# Patient Record
Sex: FEMALE | Race: AMERICAN INDIAN OR ALASKA NATIVE | Employment: OTHER | URBAN - METROPOLITAN AREA
[De-identification: names, ages, dates, MRNs, and addresses within clinical notes are randomized per-mention and may not be internally consistent; named-entity substitution may affect disease eponyms.]

---

## 2017-03-17 ENCOUNTER — HOSPITAL ENCOUNTER (OUTPATIENT)
Dept: CT IMAGING | Age: 72
Discharge: HOME | End: 2017-03-17
Attending: INTERNAL MEDICINE
Payer: MEDICARE

## 2017-03-17 DIAGNOSIS — R20.0 RIGHT UPPER EXTREMITY NUMBNESS: ICD-10-CM

## 2017-03-17 PROCEDURE — 70450 CT HEAD/BRAIN W/O DYE: CPT

## 2017-04-17 ENCOUNTER — HOSPITAL ENCOUNTER (OUTPATIENT)
Dept: RESPIRATORY THERAPY | Age: 72
Discharge: HOME | End: 2017-04-17
Attending: INTERNAL MEDICINE
Payer: MEDICARE

## 2017-04-17 DIAGNOSIS — R06.02 SHORTNESS OF BREATH: ICD-10-CM

## 2017-04-17 PROCEDURE — 94729 DIFFUSING CAPACITY: CPT

## 2017-04-17 PROCEDURE — 94060 EVALUATION OF WHEEZING: CPT

## 2017-04-17 PROCEDURE — 94726 PLETHYSMOGRAPHY LUNG VOLUMES: CPT

## 2017-05-16 ENCOUNTER — HOSPITAL ENCOUNTER (OUTPATIENT)
Dept: MAMMOGRAPHY | Age: 72
Discharge: HOME | End: 2017-05-16
Attending: INTERNAL MEDICINE
Payer: MEDICARE

## 2017-05-16 DIAGNOSIS — Z12.31 VISIT FOR SCREENING MAMMOGRAM: ICD-10-CM

## 2017-05-16 PROCEDURE — 77063 BREAST TOMOSYNTHESIS BI: CPT

## 2018-07-23 ENCOUNTER — HOSPITAL ENCOUNTER (OUTPATIENT)
Dept: BONE DENSITY | Age: 73
Discharge: HOME | End: 2018-07-23
Attending: FAMILY MEDICINE
Payer: MEDICARE

## 2018-07-23 ENCOUNTER — HOSPITAL ENCOUNTER (OUTPATIENT)
Dept: MAMMOGRAPHY | Age: 73
Discharge: HOME | End: 2018-07-23
Attending: FAMILY MEDICINE
Payer: MEDICARE

## 2018-07-23 DIAGNOSIS — Z12.39 BREAST CANCER SCREENING: ICD-10-CM

## 2018-07-23 DIAGNOSIS — M81.0 AGE-RELATED OSTEOPOROSIS WITHOUT CURRENT PATHOLOGICAL FRACTURE: ICD-10-CM

## 2018-07-23 PROCEDURE — 77080 DXA BONE DENSITY AXIAL: CPT

## 2018-07-23 PROCEDURE — 77063 BREAST TOMOSYNTHESIS BI: CPT

## 2018-09-25 ENCOUNTER — HOSPITAL ENCOUNTER (OUTPATIENT)
Dept: ULTRASOUND IMAGING | Age: 73
Discharge: HOME | End: 2018-09-25
Attending: FAMILY MEDICINE
Payer: MEDICARE

## 2018-09-25 DIAGNOSIS — R63.4 WEIGHT LOSS: ICD-10-CM

## 2018-09-25 PROCEDURE — 76700 US EXAM ABDOM COMPLETE: CPT

## 2019-01-07 ENCOUNTER — HOSPITAL ENCOUNTER (OUTPATIENT)
Age: 74
Discharge: HOME | End: 2019-01-07
Payer: MEDICARE

## 2019-01-07 DIAGNOSIS — M54.12 CERVICAL RADICULOPATHY: ICD-10-CM

## 2019-01-07 PROCEDURE — 72141 MRI NECK SPINE W/O DYE: CPT

## 2020-10-05 ENCOUNTER — TRANSCRIBE ORDER (OUTPATIENT)
Dept: SCHEDULING | Age: 75
End: 2020-10-05

## 2020-10-05 DIAGNOSIS — D64.9 ANEMIA, UNSPECIFIED: Primary | ICD-10-CM

## 2020-11-16 ENCOUNTER — HOSPITAL ENCOUNTER (OUTPATIENT)
Dept: INTERVENTIONAL RADIOLOGY/VASCULAR | Age: 75
Discharge: HOME | End: 2020-11-16
Attending: INTERNAL MEDICINE | Admitting: RADIOLOGY
Payer: MEDICARE

## 2020-11-16 VITALS
OXYGEN SATURATION: 100 % | HEIGHT: 65 IN | SYSTOLIC BLOOD PRESSURE: 122 MMHG | DIASTOLIC BLOOD PRESSURE: 62 MMHG | HEART RATE: 70 BPM | WEIGHT: 143 LBS | RESPIRATION RATE: 11 BRPM | BODY MASS INDEX: 23.82 KG/M2

## 2020-11-16 DIAGNOSIS — D64.9 ANEMIA, UNSPECIFIED: ICD-10-CM

## 2020-11-16 LAB
ANION GAP SERPL CALC-SCNC: 6 MMOL/L (ref 3–18)
APTT PPP: 50.9 SEC (ref 23–36.4)
BASOPHILS # BLD: 0 K/UL (ref 0–0.1)
BASOPHILS NFR BLD: 0 % (ref 0–2)
BUN SERPL-MCNC: 25 MG/DL (ref 7–18)
BUN/CREAT SERPL: 22 (ref 12–20)
CALCIUM SERPL-MCNC: 9.3 MG/DL (ref 8.5–10.1)
CHLORIDE SERPL-SCNC: 103 MMOL/L (ref 100–111)
CO2 SERPL-SCNC: 29 MMOL/L (ref 21–32)
CREAT SERPL-MCNC: 1.15 MG/DL (ref 0.6–1.3)
DIFFERENTIAL METHOD BLD: ABNORMAL
EOSINOPHIL # BLD: 0.2 K/UL (ref 0–0.4)
EOSINOPHIL NFR BLD: 3 % (ref 0–5)
ERYTHROCYTE [DISTWIDTH] IN BLOOD BY AUTOMATED COUNT: 14.4 % (ref 11.6–14.5)
EST. AVERAGE GLUCOSE BLD GHB EST-MCNC: 295 MG/DL
GLUCOSE SERPL-MCNC: 262 MG/DL (ref 74–99)
HBA1C MFR BLD: 11.9 % (ref 4.2–5.6)
HCT VFR BLD AUTO: 31 % (ref 35–45)
HGB BLD-MCNC: 10.3 G/DL (ref 12–16)
INR PPP: 1.1 (ref 0.8–1.2)
LYMPHOCYTES # BLD: 1.7 K/UL (ref 0.9–3.6)
LYMPHOCYTES NFR BLD: 26 % (ref 21–52)
MCH RBC QN AUTO: 28.2 PG (ref 24–34)
MCHC RBC AUTO-ENTMCNC: 33.2 G/DL (ref 31–37)
MCV RBC AUTO: 84.9 FL (ref 74–97)
MONOCYTES # BLD: 0.6 K/UL (ref 0.05–1.2)
MONOCYTES NFR BLD: 9 % (ref 3–10)
NEUTS SEG # BLD: 4.1 K/UL (ref 1.8–8)
NEUTS SEG NFR BLD: 62 % (ref 40–73)
PLATELET # BLD AUTO: 327 K/UL (ref 135–420)
PMV BLD AUTO: 9.8 FL (ref 9.2–11.8)
POTASSIUM SERPL-SCNC: 4 MMOL/L (ref 3.5–5.5)
PROTHROMBIN TIME: 14.2 SEC (ref 11.5–15.2)
RBC # BLD AUTO: 3.65 M/UL (ref 4.2–5.3)
SODIUM SERPL-SCNC: 138 MMOL/L (ref 136–145)
WBC # BLD AUTO: 6.6 K/UL (ref 4.6–13.2)

## 2020-11-16 PROCEDURE — 85610 PROTHROMBIN TIME: CPT

## 2020-11-16 PROCEDURE — 88311 DECALCIFY TISSUE: CPT

## 2020-11-16 PROCEDURE — 38221 DX BONE MARROW BIOPSIES: CPT

## 2020-11-16 PROCEDURE — 88237 TISSUE CULTURE BONE MARROW: CPT

## 2020-11-16 PROCEDURE — 80048 BASIC METABOLIC PNL TOTAL CA: CPT

## 2020-11-16 PROCEDURE — 85730 THROMBOPLASTIN TIME PARTIAL: CPT

## 2020-11-16 PROCEDURE — 88184 FLOWCYTOMETRY/ TC 1 MARKER: CPT

## 2020-11-16 PROCEDURE — 74011250636 HC RX REV CODE- 250/636: Performed by: RADIOLOGY

## 2020-11-16 PROCEDURE — 85025 COMPLETE CBC W/AUTO DIFF WBC: CPT

## 2020-11-16 PROCEDURE — 88313 SPECIAL STAINS GROUP 2: CPT

## 2020-11-16 PROCEDURE — 88185 FLOWCYTOMETRY/TC ADD-ON: CPT

## 2020-11-16 PROCEDURE — 83036 HEMOGLOBIN GLYCOSYLATED A1C: CPT

## 2020-11-16 PROCEDURE — 74011000250 HC RX REV CODE- 250: Performed by: RADIOLOGY

## 2020-11-16 PROCEDURE — 88264 CHROMOSOME ANALYSIS 20-25: CPT

## 2020-11-16 PROCEDURE — 88305 TISSUE EXAM BY PATHOLOGIST: CPT

## 2020-11-16 RX ORDER — WARFARIN SODIUM 5 MG/1
5 TABLET ORAL
COMMUNITY

## 2020-11-16 RX ORDER — MIDAZOLAM HYDROCHLORIDE 1 MG/ML
.5-2 INJECTION, SOLUTION INTRAMUSCULAR; INTRAVENOUS
Status: DISCONTINUED | OUTPATIENT
Start: 2020-11-16 | End: 2020-11-16 | Stop reason: HOSPADM

## 2020-11-16 RX ORDER — ONDANSETRON 2 MG/ML
4 INJECTION INTRAMUSCULAR; INTRAVENOUS
Status: DISCONTINUED | OUTPATIENT
Start: 2020-11-16 | End: 2020-11-16 | Stop reason: HOSPADM

## 2020-11-16 RX ORDER — LIDOCAINE HYDROCHLORIDE 10 MG/ML
30 INJECTION, SOLUTION EPIDURAL; INFILTRATION; INTRACAUDAL; PERINEURAL ONCE
Status: COMPLETED | OUTPATIENT
Start: 2020-11-16 | End: 2020-11-16

## 2020-11-16 RX ORDER — METFORMIN HYDROCHLORIDE 500 MG/1
500 TABLET ORAL
COMMUNITY

## 2020-11-16 RX ORDER — AMLODIPINE AND BENAZEPRIL HYDROCHLORIDE 5; 40 MG/1; MG/1
1 CAPSULE ORAL DAILY
COMMUNITY

## 2020-11-16 RX ORDER — ATORVASTATIN CALCIUM 40 MG/1
40 TABLET, FILM COATED ORAL DAILY
COMMUNITY

## 2020-11-16 RX ORDER — SODIUM CHLORIDE 9 MG/ML
75 INJECTION, SOLUTION INTRAVENOUS CONTINUOUS
Status: DISCONTINUED | OUTPATIENT
Start: 2020-11-16 | End: 2020-11-16 | Stop reason: HOSPADM

## 2020-11-16 RX ORDER — HYDROCODONE BITARTRATE AND ACETAMINOPHEN 5; 325 MG/1; MG/1
1 TABLET ORAL
Status: DISCONTINUED | OUTPATIENT
Start: 2020-11-16 | End: 2020-11-16 | Stop reason: HOSPADM

## 2020-11-16 RX ORDER — SODIUM CHLORIDE 9 MG/ML
20 INJECTION, SOLUTION INTRAVENOUS CONTINUOUS
Status: DISCONTINUED | OUTPATIENT
Start: 2020-11-16 | End: 2020-11-16 | Stop reason: HOSPADM

## 2020-11-16 RX ORDER — FENTANYL CITRATE 50 UG/ML
12.5-5 INJECTION, SOLUTION INTRAMUSCULAR; INTRAVENOUS
Status: DISCONTINUED | OUTPATIENT
Start: 2020-11-16 | End: 2020-11-16 | Stop reason: HOSPADM

## 2020-11-16 RX ADMIN — FENTANYL CITRATE 25 MCG: 50 INJECTION, SOLUTION INTRAMUSCULAR; INTRAVENOUS at 09:55

## 2020-11-16 RX ADMIN — FENTANYL CITRATE 50 MCG: 50 INJECTION, SOLUTION INTRAMUSCULAR; INTRAVENOUS at 09:50

## 2020-11-16 RX ADMIN — MIDAZOLAM 0.5 MG: 1 INJECTION INTRAMUSCULAR; INTRAVENOUS at 09:55

## 2020-11-16 RX ADMIN — LIDOCAINE HYDROCHLORIDE 30 ML: 10 INJECTION, SOLUTION EPIDURAL; INFILTRATION; INTRACAUDAL; PERINEURAL at 09:50

## 2020-11-16 RX ADMIN — MIDAZOLAM 1 MG: 1 INJECTION INTRAMUSCULAR; INTRAVENOUS at 09:50

## 2020-11-16 NOTE — ROUTINE PROCESS
TRANSFER - OUT REPORT:    Verbal report given to [address] [facility] on [address]  being transferred to [facility] for routine progression of care       Report consisted of patients Situation, Background, Assessment and   Recommendations(SBAR). Information from the following report(s) SBAR, Kardex, Procedure Summary, Intake/Output, MAR and Recent Results was reviewed with the receiving nurse. Lines:   Peripheral IV 11/16/20 Right Antecubital (Active)   Site Assessment Clean, dry, & intact 11/16/20 0945   Phlebitis Assessment 0 11/16/20 0945   Infiltration Assessment 0 11/16/20 0945   Dressing Status Clean, dry, & intact 11/16/20 0945   Dressing Type Transparent 11/16/20 0945   Hub Color/Line Status Pink; Infusing;Patent 11/16/20 0945        Opportunity for questions and clarification was provided.       Patient transported with:   Registered Nurse

## 2020-11-16 NOTE — PROGRESS NOTES
TRANSFER - IN REPORT:    Verbal report received from Tj(name) on [facility]  being received from IR(unit) for routine post - op      Report consisted of patients Situation, Background, Assessment and   Recommendations(SBAR). Information from the following report(s) SBAR, Procedure Summary and MAR was reviewed with the receiving nurse. Opportunity for questions and clarification was provided. Assessment completed upon patients arrival to unit and care assumed.

## 2020-11-16 NOTE — H&P
OUTPATIENT HISTORY AND PHYSICAL      Today 11/16/2020     Indication/Symptoms:   Reina Lockhart is a 66 y.o. female presenting for a bone marrow biopsy with moderate sedation at the request of due to anemia. Current Meds:    Prior to Admission medications    Medication Sig Start Date End Date Taking? Authorizing Provider   atorvastatin (Lipitor) 40 mg tablet Take 40 mg by mouth daily. Yes Provider, Historical   metFORMIN (GLUCOPHAGE) 500 mg tablet Take 500 mg by mouth daily (with breakfast). Yes Provider, Historical   warfarin (COUMADIN) 5 mg tablet Take 5 mg by mouth three (3) days a week. Yes Provider, Historical   amLODIPine-benazepril (LOTREL) 5-40 mg per capsule Take 1 Cap by mouth daily. Yes Provider, Historical   benazepril (LOTENSIN) 5 mg tablet Take 5 mg by mouth daily. Provider, Historical   glipiZIDE SR (GLUCOTROL) 2.5 mg CR tablet Take  by mouth daily. Provider, Historical       Allergies:    No Known Allergies    Comorbid Conditions:    Past Medical History:   Diagnosis Date    Diabetes ([facility] [address]     Hypertension           Past Surgical History:   Procedure Laterality Date    COLONOSCOPY N/A 4/12/2018    COLONOSCOPY, DIAGNOSTIC performed by Mirta Hernández MD at [facility] ENDOSCOPY     Data:    Visit Vitals  /70 (BP 1 Location: Left arm, BP Patient Position: At rest)   Pulse 78   Resp 15   Ht 5' 5\" (1.651 m)   Wt 64.9 kg (143 lb)   SpO2 100%   BMI 23.80 kg/m²   :  Recent Labs     11/16/20  0848        Recent Labs     11/16/20  0848   INR 1.1   APTT 50.9*       The H & P and/or progress notes and any available imaging were reviewed. The risks, indications and possible alternatives to the procedure, including doing nothing, were discussed and informed consent was obtained. Physical Exam:      Mental status:   Alert and oriented. Examination specific to the procedure proposed to be performed and any co morbid conditions:   Mallampati classification 2, ASA III.    Heart: Regular rate. Lungs:   Normal respiratory effort. Symmetrical rise and fall of chest    The patient is an appropriate candidate to undergo the planned procedure and sedation.     Martin Rodriguez PA-C

## 2020-11-16 NOTE — PROCEDURES
RADIOLOGY POST PROCEDURE NOTE     November 16, 2020       10:06 AM     Preoperative Diagnosis:   Anemia. Postoperative Diagnosis:  Same. :  Dr. Lisy Cheek    Assistant:  None. Type of Anesthesia: 1% plain lidocaine and IV moderate sedation with Versed and Fentanyl. Procedure/Description:  Image guided bone marrow Bx. Findings:   No bleeding. Estimated blood Loss:  Minimal    Specimen Removed:   yes    Blood transfusions:  None. Implants:  None.     Complications: None    Condition: Stable    Discharge Plan:  discharge home     Tin Brannon MD

## 2020-11-16 NOTE — DISCHARGE INSTRUCTIONS
Patient Education        Bone Marrow Aspiration and Biopsy: What to Expect at Home  Your Recovery  The biopsy site may feel sore for several days. It can help to walk, take pain medicine, and put ice packs on the site. You will probably be able to return to work and your usual activities the day after the procedure. Your doctor or nurse will call you with the results of your test.  This care sheet gives you a general idea about how long it will take for you to recover. But each person recovers at a different pace. Follow the steps below to get better as quickly as possible. How can you care for yourself at home? Activity    · Rest when you feel tired. Getting enough sleep will help you recover.     · You may drive when you are no longer taking pain pills and can quickly move your foot from the gas pedal to the brake. You must also be able to sit comfortably for a long period of time, even if you do not plan to go far. You might get caught in traffic.     · Most people are able to return to work the day after the procedure. Medicines    · Your doctor will tell you if and when you can restart your medicines. He or she will also give you instructions about taking any new medicines.     · If you take aspirin or some other blood thinner, ask your doctor if and when to start taking it again. Make sure that you understand exactly what your doctor wants you to do.     · Be safe with medicines. Take pain medicines exactly as directed. ? If the doctor gave you a prescription medicine for pain, take it as prescribed. ? If you are not taking a prescription pain medicine, take an over-the-counter medicine such as acetaminophen (Tylenol), ibuprofen (Advil, Motrin), or naproxen (Aleve). Read and follow all instructions on the label. ? Do not take two or more pain medicines at the same time unless the doctor told you to. Many pain medicines have acetaminophen, which is Tylenol.  Too much acetaminophen (Tylenol) can be harmful.     · If you think your pain medicine is making you sick to your stomach:  ? Take your medicine after meals (unless your doctor has told you not to). ? Ask your doctor for a different pain medicine.     · If your doctor prescribed antibiotics, take them as directed. Do not stop taking them just because you feel better. Ice    · Put ice or a cold pack on the biopsy site for 10 to 20 minutes at a time. Put a thin cloth between the ice and your skin. Follow-up care is a key part of your treatment and safety. Be sure to make and go to all appointments, and call your doctor if you are having problems. It's also a good idea to know your test results and keep a list of the medicines you take. When should you call for help? Call 911 anytime you think you may need emergency care. For example, call if:    · You passed out (lost consciousness). Call your doctor now or seek immediate medical care if:    · You have signs of infection, such as:  ? Increased pain, swelling, warmth, or redness. ? Red streaks leading from the biopsy site. ? Pus draining from the biopsy site. ? Swollen lymph nodes in your neck, armpits, or groin. ? A fever. Watch closely for any changes in your health, and be sure to contact your doctor if:    · You are not getting better as expected. Where can you learn more? Go to http://www.gray.com/  Enter E148 in the search box to learn more about \"Bone Marrow Aspiration and Biopsy: What to Expect at Home. \"  Current as of: December 9, 2019               Content Version: 12.6  © 4653-5398 [employer]. Care instructions adapted under license by "[employer] (which disclaims liability or warranty for this information). If you have questions about a medical condition or this instruction, always ask your healthcare professional. [address] any warranty or liability for your use of this information.

## 2021-02-01 ENCOUNTER — TRANSCRIBE ORDER (OUTPATIENT)
Dept: SCHEDULING | Age: 76
End: 2021-02-01

## 2021-02-01 DIAGNOSIS — R10.9 ABDOMINAL CRAMPS: ICD-10-CM

## 2021-02-01 DIAGNOSIS — R63.4 LOSS OF WEIGHT: Primary | ICD-10-CM

## 2021-02-22 ENCOUNTER — HOSPITAL ENCOUNTER (OUTPATIENT)
Dept: CT IMAGING | Age: 76
Discharge: HOME | End: 2021-02-22
Attending: INTERNAL MEDICINE
Payer: MEDICARE

## 2021-02-22 DIAGNOSIS — R10.9 ABDOMINAL CRAMPS: ICD-10-CM

## 2021-02-22 DIAGNOSIS — R63.4 LOSS OF WEIGHT: ICD-10-CM

## 2021-02-22 LAB — CREAT UR-MCNC: 1.1 MG/DL (ref 0.6–1.3)

## 2021-02-22 PROCEDURE — 74011000636 HC RX REV CODE- 636: Performed by: INTERNAL MEDICINE

## 2021-02-22 PROCEDURE — 74177 CT ABD & PELVIS W/CONTRAST: CPT

## 2021-02-22 PROCEDURE — 82565 ASSAY OF CREATININE: CPT

## 2021-02-22 RX ADMIN — IOPAMIDOL 100 ML: 612 INJECTION, SOLUTION INTRAVENOUS at 14:55

## 2021-02-25 ENCOUNTER — TRANSCRIBE ORDER (OUTPATIENT)
Dept: SCHEDULING | Age: 76
End: 2021-02-25

## 2021-02-25 DIAGNOSIS — R93.1 ABNORMAL ECHOCARDIOGRAM: ICD-10-CM

## 2021-02-25 DIAGNOSIS — R63.4 WEIGHT LOSS: ICD-10-CM

## 2021-02-25 DIAGNOSIS — I51.7 CARDIOMEGALY: Primary | ICD-10-CM

## 2021-02-25 DIAGNOSIS — C78.7 SECONDARY MALIGNANT NEOPLASM OF LIVER (HCC): ICD-10-CM

## 2021-02-25 DIAGNOSIS — I48.91 ATRIAL FIBRILLATION (HCC): ICD-10-CM

## 2021-02-25 DIAGNOSIS — Z79.899 ENCOUNTER FOR LONG-TERM (CURRENT) USE OF OTHER MEDICATIONS: ICD-10-CM

## 2021-05-05 ENCOUNTER — TELEPHONE (OUTPATIENT)
Dept: CARDIAC REHAB | Age: 76
End: 2021-05-05

## 2021-05-05 NOTE — TELEPHONE ENCOUNTER
Cardiac Rehab called patient and she wants to see what her insurance covers for the program. Will follow up with benefit information.     Thank you,  Sebastián Gallardo

## 2021-05-12 ENCOUNTER — HOSPITAL ENCOUNTER (OUTPATIENT)
Dept: CARDIAC REHAB | Age: 76
Discharge: HOME | End: 2021-05-12
Payer: MEDICARE

## 2021-05-12 VITALS — BODY MASS INDEX: 23.96 KG/M2 | WEIGHT: 144 LBS

## 2021-05-12 PROCEDURE — 93798 PHYS/QHP OP CAR RHAB W/ECG: CPT

## 2021-05-12 NOTE — PROGRESS NOTES
Outpatient Nutritional Counseling Order    Alex Wagner is a [de-identified] year old [de-identified] with [unfilled]. She is enrolled in cardiac rehabilitation and the treating clinician believes the patient would benefit from nutritional counseling services due to her diagnosis of Type 2 Diabetes. Please co-sign this note if you would like your patient to be evaluated by a Registered Dietician in our clinic.

## 2021-05-12 NOTE — PROGRESS NOTES
CARDIAC REHAB INITIAL ITP FOR REVIEW AND SIGNATURE    Alba Puente 64 y.o. presented to cardiac rehab for an intake and a six minute walk test today with a primary diagnosis of Congestive Heart Failure. Patient's EF is 29%. Alba Puente has a history of Hypertension, Hyperlipidemia, Diabetes Mellitus and CHF. Cardiac risk factors include diabetes mellitus, hypertension and these were reviewed with patient. Alba Puente is not  and lives with daughter . PHQ-9, depression score, is 0 and this is considered to below. The result was discussed with patient who confirms score to be accurate and  a copy of patient's results were sent to patient's PCP. Patient denied chest pain or SOB during 6 minute walk and the cardiac rhythm was in Atrial Fibrillation. Alba Puente will attend exercise and educational sessions 3 days a week in cardiac rehab for 36 sessions. Goals for Rehab:    Patient name: Alba Puente : 1945         Goals Comments   1. Increase heart strength   [x] initial  [] met                  [] not met  [] progressing  Pt. Would like to increase EF from 29% to 35% by end of program    2.  Walk faster   [x] initial  [] met                  [] not met  [] progressing Improve walking speed from 1.2 to 1.6 by next recert         ardiac ITP     CR INTAKE from 2021 in [facility]34   Treatment Diagnosis   Treatment Diagnosis 1 HF   HF Date 21   Referral Date 21   Significant Cardiovascular History Chronic atrial fibrillation, History of heart failure   Co-morbidities Diabetes, Pulmonary disease   Individual Treatment Plan   ITP Visit Type Initial Assessment   1st Date of Exercise  21   ITP Next Review Date 21   Visit #/Total Visits    EF % 29 %   Risk Stratification High   ITP Exercise, Psychosocial, Tobacco, Nutrition, Education   Exercise    Stages of Change Preparation   DASI Total Score 32.95   Assisted Devices None   Total Score 1   Test Six minute walk test   Exercise Prescription   Mode Treadmill, Bike, Stepper, Ergometer   Frequency per week 3   Duration per session 35-45   Intensity  METS       1.5-2.5   RPE 11-13   Target Heart Rate 85-99   Resistance Training Yes   Exercise Blood Pressures   Resting /75   Peak /80   Is BP WDL? Yes   Exercise Activity at Home   Type None   Resistance Training No   Exercise Education   Education Exercise safety, Signs/Symptoms to report, RPE scale, Equipment orientation, Warm up/Cool down, Physically active, Self pulse   Exercise Target Goal   Target Goal(s) Individual exercise RX, Aerobic activity 30 + minutes/day  5 days/week, BP < 140/90 or < 130/80, if DM or CKD   Patient Stated Exercise Goals increase heart strength, walk farther faster   Psychosocial   Stages of Change Preparation   [facility] Total Score 11   PHQ 9 Score 0   Psychosocial Intervention   Interventions No intervention indicated   Consults    Currently Taking Psychotropic Meds No   Medication Changes No   Psychosocial Education   Education Advanced directives, Benefits of CPR completion, Cardiac meds, Environmental triggers, Coping techniques, Impact self care behaviors on health, Relaxation techniques, Sexual activity, Signs/Symptoms of depression, Stress management class, Support groups, Tobacco dangers, Traveling with lung disease   Psychosocial Target Goals   Target Goal(s) Engages in self-care behaviors, Demonstrates appropriate interaction with others, Assess presence or absence of depression using a valid screening tool, Positive support group, Maximizes coping skills   Uses Stress Mgmt Techniques No   Patient Stated Psychosocial Goals pt. will have consult with LCSW   Nutrition   Stages of Change Preparation   Diabetes Yes   HbA1C 7.5   HbA1C Date 03/18/21   BG at Home Yes   BG Frequency Occasional   Diabetes Med(s) metformin, amaryl   Diabetes Med(s) Change No   BG in Range?  Yes   Random  Lipids   Date Lipids Drawn 03/22/21   Total 117   HDL 32   LDL 70   Triglycerides 73   Lipid Med(s) lipitor   Lipid Med Change(s) No   Weight Management   Weight  65.3 kg (144 lb)   Height  5' 5\" (1.651 m)   BMI 24.01   Weight Goal gain to 150   Waist Circumference  36   Alcohol Special   Nutrition Assessment Tool RYP   Rate Your Plate Total Score 52   Nutrition Intervention   Dietitian Consult Yes   Nurse/Patient Discussion Yes   Nutrition Goals check sodium on nutrition facts   Nutrition Class Yes   Diabetes Education Referral No   Lipid Clinic Referral No   Weight Management Referral No   Nutrition Education   Education Signs/Symptoms Hypo/Hyperglycemia, DM & CAD relationship, Low sodium diet, Healthy eating   Nutrition Target Goals   Target Goals HbA1C less than 7 percent, Waist size less than 40 inches males and less than 35 inches for females   Patient Stated Nutrition Goals gain to 150   Education   Learning Barrier Ready to learn   Education Intervention   Education Schedule Given No   Patient Education    Education CAD, Risk factors, Med Compliance, Cardiac A&P, Signs/Symptoms of Angina   Hypertension Yes   Hypertension Controlled Yes   Is BP WDL?  Yes   Med(s) Change No   BP Meds hydralazine, metropolol   Education Target Goals   Target Goals Medication compliance, Risk factors, Understand target guidelines for B/P, Understand target guidelines for lipids   Patient Stated Education Goals cardiac meds, nutrition   Physician Response   Exercise     CR INTAKE from 5/12/2021 in [address] Common Questions   ITP Next Review Date 06/11/21   Exercise Treatment Log   Weight --   Peak RPE 11                   Samuel Dobson 5/12/2021 4:26 PM

## 2021-05-12 NOTE — PROGRESS NOTES
Outpatient Behavioral Health Evaluation Order    Paul Valdes is a 64year old female with Congestive Heart Failure. She is enrolled in cardiac rehabilitation and the treating clinician believes the patient is demonstrating signs of anxiety. Nancy's admitting PHQ-9 depression score was 0 which is considered low, however she is having anxiety due to new onset A-fib and CHF. Please co-sign this note if you would like your patient to be evaluated by a LCSW in our clinic.

## 2021-05-14 ENCOUNTER — HOSPITAL ENCOUNTER (OUTPATIENT)
Dept: CARDIAC REHAB | Age: 76
Discharge: HOME | End: 2021-05-14
Payer: MEDICARE

## 2021-05-14 ENCOUNTER — HOSPITAL ENCOUNTER (OUTPATIENT)
Dept: CARDIAC REHAB | Age: 76
End: 2021-05-14
Payer: MEDICARE

## 2021-05-14 VITALS — BODY MASS INDEX: 24.3 KG/M2 | WEIGHT: 146 LBS

## 2021-05-14 PROCEDURE — 93798 PHYS/QHP OP CAR RHAB W/ECG: CPT

## 2021-05-19 ENCOUNTER — HOSPITAL ENCOUNTER (OUTPATIENT)
Dept: CARDIAC REHAB | Age: 76
Discharge: HOME | End: 2021-05-19
Payer: MEDICARE

## 2021-05-19 VITALS — WEIGHT: 143 LBS | BODY MASS INDEX: 23.8 KG/M2

## 2021-05-19 PROCEDURE — 93797 PHYS/QHP OP CAR RHAB WO ECG: CPT

## 2021-05-19 PROCEDURE — 93798 PHYS/QHP OP CAR RHAB W/ECG: CPT

## 2021-05-24 ENCOUNTER — HOSPITAL ENCOUNTER (OUTPATIENT)
Dept: CARDIAC REHAB | Age: 76
Discharge: HOME | End: 2021-05-24
Payer: MEDICARE

## 2021-05-24 VITALS — BODY MASS INDEX: 24.3 KG/M2 | WEIGHT: 146 LBS

## 2021-05-24 PROCEDURE — 93798 PHYS/QHP OP CAR RHAB W/ECG: CPT

## 2021-05-26 ENCOUNTER — HOSPITAL ENCOUNTER (OUTPATIENT)
Dept: CARDIAC REHAB | Age: 76
Discharge: HOME | End: 2021-05-26
Payer: MEDICARE

## 2021-05-26 VITALS — BODY MASS INDEX: 23.8 KG/M2 | WEIGHT: 143 LBS

## 2021-05-26 PROCEDURE — 93798 PHYS/QHP OP CAR RHAB W/ECG: CPT

## 2021-05-28 ENCOUNTER — HOSPITAL ENCOUNTER (OUTPATIENT)
Dept: CARDIAC REHAB | Age: 76
Discharge: HOME | End: 2021-05-28
Payer: MEDICARE

## 2021-05-28 VITALS — WEIGHT: 143 LBS | BODY MASS INDEX: 23.8 KG/M2

## 2021-05-28 PROCEDURE — 93798 PHYS/QHP OP CAR RHAB W/ECG: CPT

## 2021-06-02 ENCOUNTER — HOSPITAL ENCOUNTER (OUTPATIENT)
Dept: CARDIAC REHAB | Age: 76
Discharge: HOME | End: 2021-06-02
Payer: MEDICARE

## 2021-06-02 VITALS — BODY MASS INDEX: 23.46 KG/M2 | WEIGHT: 141 LBS

## 2021-06-02 PROCEDURE — 93798 PHYS/QHP OP CAR RHAB W/ECG: CPT

## 2021-06-04 ENCOUNTER — HOSPITAL ENCOUNTER (OUTPATIENT)
Dept: CARDIAC REHAB | Age: 76
Discharge: HOME | End: 2021-06-04
Payer: MEDICARE

## 2021-06-04 VITALS — WEIGHT: 141 LBS | BODY MASS INDEX: 23.46 KG/M2

## 2021-06-04 PROCEDURE — 93798 PHYS/QHP OP CAR RHAB W/ECG: CPT

## 2021-06-07 ENCOUNTER — HOSPITAL ENCOUNTER (OUTPATIENT)
Dept: CARDIAC REHAB | Age: 76
Discharge: HOME | End: 2021-06-07
Payer: MEDICARE

## 2021-06-07 VITALS — BODY MASS INDEX: 23.46 KG/M2 | WEIGHT: 141 LBS

## 2021-06-07 PROCEDURE — 93798 PHYS/QHP OP CAR RHAB W/ECG: CPT

## 2021-06-09 ENCOUNTER — HOSPITAL ENCOUNTER (OUTPATIENT)
Dept: CARDIAC REHAB | Age: 76
Discharge: HOME | End: 2021-06-09
Payer: MEDICARE

## 2021-06-09 VITALS — WEIGHT: 140 LBS | BODY MASS INDEX: 23.3 KG/M2

## 2021-06-09 PROCEDURE — 93797 PHYS/QHP OP CAR RHAB WO ECG: CPT

## 2021-06-09 PROCEDURE — 93798 PHYS/QHP OP CAR RHAB W/ECG: CPT

## 2021-06-10 NOTE — PROGRESS NOTES
CARDIAC REHAB ITP REASSESSMENT FOR REVIEW AND SIGNATURE  Patient name: Brendan Lopes : 1945     Visits from Start of Care: 12      Reporting Period:  to 6/10    Subjective Reports: Pt. Progressing well, no complaints     Goals Comments   1. Increase heart strength [] met                  [] not met  [x] progressing Pt. Would like to increase EF from 29% to 35% by end of program     2. Walk faster   [x] met                  [] not met  [] progressing Improve walking speed from 1.2 to 1.6 by next recert   3. Walk faster    [] met                  [] not met  [x] progressing Improve walking speed from 1.9 to 2.1 by next recert period. Pt increased her speed from 1.2 to 1.9 during this recert period. Key functional changes: Pt. Has increased her walking speed from 1.2 to 1.9 during this recert period      Problems/ barriers to goal attainment: None    Assessment / Recommendations: Continue w/ rehab    Thurl Goldmann, RN 6/10/2021 3:59 PM    Cardiac ITP     CR PHASE II from 2021 in [place] [id number]   Treatment Diagnosis 1 HF   HF Date 21   Referral Date 21   Significant Cardiovascular History Chronic atrial fibrillation, History of heart failure   Co-morbidities Diabetes, Pulmonary disease   ITP Visit Type Re-Assessment   1st Date of Exercise  21   ITP Next Review Date 07/10/21   Visit #/Total Visits    EF % 29 %   Risk Stratification High   ITP Exercise, Psychosocial, Tobacco, Nutrition, Education   Stages of Change Action   Assisted Devices None   Mode Treadmill, Bike, Stepper, Ergometer   Frequency per week 3   Duration per session 35-45   Intensity  METS       1.5-2.7   RPE 11-13   Target Heart Rate 85-99   Resistance Training Yes   Resting /77   Peak /69   Is BP WDL?  Yes   Type None   Resistance Training No   Education Exercise safety, Signs/Symptoms to report, RPE scale, Equipment orientation, Warm up/Cool down, Physically active, Self pulse   Target Goal(s) Individual exercise RX, Aerobic activity 30 + minutes/day  5 days/week, BP < 140/90 or < 130/80, if DM or CKD   Patient Stated Exercise Goals increase heart strength, walk farther faster   Stages of Change Action   Interventions No intervention indicated   Currently Taking Psychotropic Meds No   Medication Changes No   Education Environmental triggers, Coping techniques, Impact self care behaviors on health, Relaxation techniques, Stress management class, Cardiac meds, Support groups   Target Goal(s) Engages in self-care behaviors, Demonstrates appropriate interaction with others, Assess presence or absence of depression using a valid screening tool, Positive support group, Maximizes coping skills   Uses Stress Mgmt Techniques No   Patient Stated Psychosocial Goals pt. will have consult with LCSW   Stages of Change Action   Diabetes Yes   HbA1C 7.5   BG at Home Yes   BG Frequency Occasional   Diabetes Med(s) metformin, amaryl   Diabetes Med(s) Change No   BG in Range?  Yes   Random    Date Lipids Drawn 03/22/21   Total 117   HDL 32   LDL 70   Triglycerides 73   Lipid Med(s) lipitor   Lipid Med Change(s) No   Weight  63.5 kg (140 lb)   Height  5' 5\" (1.651 m)   BMI 23.35   Weight Goal gain to 150   Waist Circumference  36   Alcohol Special   Nutrition Assessment Tool RYP   Dietitian Consult Yes   Nurse/Patient Discussion Yes   Nutrition Goals check sodium on nutrition facts   Nutrition Class Yes   Diabetes Education Referral No   Lipid Clinic Referral No   Weight Management Referral No   Education DM & CAD relationship, Signs/Symptoms Hypo/Hyperglycemia, Low fat & cholesterol diet, Carb-controlled diet, Low sodium diet, Healthy eating   Target Goals HbA1C less than 7 percent, Waist size less than 40 inches males and less than 35 inches for females   Patient Stated Nutrition Goals gain to 150   Learning Barrier Ready to learn   Education Schedule Given No   Education CAD, Risk factors, Med Compliance, Cardiac A&P, Signs/Symptoms of Angina   Hypertension Yes   Hypertension Controlled Yes   Is BP WDL?  Yes   Med(s) Change No   BP Meds hydralazine, metropolol   Target Goals Medication compliance, Risk factors, Understand target guidelines for B/P, Understand target guidelines for lipids   Patient Stated Education Goals cardiac meds, nutrition   Exercise     CR PHASE II from 6/9/2021 in [facility]   Any problems changes since your last visit Denies   Any symptoms while exercising denies   Psychosocial/Stress Level 0   Resting EKG rhythm SR w/BBB   Tobacco Use None   ITP Next Review Date 07/10/21   Visit Number/Total Visits 12/36  [education class (nutrition-eat like your life depends on it)]   On Call Medical Director Immediately Available [place]   Target Heart Rate(Range) 85-99   Resting HR 98   Resting /77   Recovery HR 84   Recovery /69   Weight 63.5 kg (140 lb)   Exercise EKG Rhythm Afib/SR w/BBB/PVCs   Exercise Duration 45   Peak    Peak RPE 16   Peak Mets 2.7   Asymptomatic yes   Total Minutes 55

## 2021-06-11 ENCOUNTER — HOSPITAL ENCOUNTER (OUTPATIENT)
Dept: CARDIAC REHAB | Age: 76
Discharge: HOME | End: 2021-06-11
Payer: MEDICARE

## 2021-06-11 VITALS — WEIGHT: 140 LBS | BODY MASS INDEX: 23.3 KG/M2

## 2021-06-11 PROCEDURE — 93798 PHYS/QHP OP CAR RHAB W/ECG: CPT

## 2021-06-14 ENCOUNTER — HOSPITAL ENCOUNTER (OUTPATIENT)
Dept: CARDIAC REHAB | Age: 76
Discharge: HOME | End: 2021-06-14
Payer: MEDICARE

## 2021-06-14 VITALS — BODY MASS INDEX: 23.63 KG/M2 | WEIGHT: 142 LBS

## 2021-06-14 PROCEDURE — 93798 PHYS/QHP OP CAR RHAB W/ECG: CPT

## 2021-06-16 ENCOUNTER — HOSPITAL ENCOUNTER (OUTPATIENT)
Dept: CARDIAC REHAB | Age: 76
Discharge: HOME | End: 2021-06-16
Payer: MEDICARE

## 2021-06-16 VITALS — BODY MASS INDEX: 22.96 KG/M2 | WEIGHT: 138 LBS

## 2021-06-16 PROCEDURE — 93798 PHYS/QHP OP CAR RHAB W/ECG: CPT

## 2021-06-17 ENCOUNTER — HOSPITAL ENCOUNTER (OUTPATIENT)
Dept: CARDIAC REHAB | Age: 76
Discharge: HOME | End: 2021-06-17
Payer: MEDICARE

## 2021-06-17 VITALS — WEIGHT: 140 LBS | BODY MASS INDEX: 23.3 KG/M2

## 2021-06-17 PROCEDURE — 93798 PHYS/QHP OP CAR RHAB W/ECG: CPT

## 2021-06-17 RX ORDER — SPIRONOLACTONE 25 MG/1
25 TABLET ORAL DAILY
COMMUNITY

## 2021-06-17 RX ORDER — BUMETANIDE 1 MG/1
1 TABLET ORAL DAILY
COMMUNITY

## 2021-06-17 RX ORDER — GLIMEPIRIDE 4 MG/1
4 TABLET ORAL
COMMUNITY

## 2021-06-17 RX ORDER — HYDRALAZINE HYDROCHLORIDE 10 MG/1
10 TABLET, FILM COATED ORAL
COMMUNITY

## 2021-06-17 RX ORDER — METOPROLOL SUCCINATE 50 MG/1
50 TABLET, EXTENDED RELEASE ORAL DAILY
COMMUNITY

## 2021-06-17 RX ORDER — ASPIRIN 81 MG/1
81 TABLET ORAL DAILY
COMMUNITY

## 2021-06-17 RX ORDER — LISINOPRIL 5 MG/1
5 TABLET ORAL 2 TIMES DAILY
COMMUNITY

## 2021-06-21 ENCOUNTER — APPOINTMENT (OUTPATIENT)
Dept: CARDIAC REHAB | Age: 76
End: 2021-06-21
Payer: MEDICARE

## 2021-06-23 ENCOUNTER — APPOINTMENT (OUTPATIENT)
Dept: CARDIAC REHAB | Age: 76
End: 2021-06-23
Payer: MEDICARE

## 2021-06-25 ENCOUNTER — APPOINTMENT (OUTPATIENT)
Dept: CARDIAC REHAB | Age: 76
End: 2021-06-25
Payer: MEDICARE

## 2021-06-28 ENCOUNTER — HOSPITAL ENCOUNTER (OUTPATIENT)
Dept: CARDIAC REHAB | Age: 76
Discharge: HOME | End: 2021-06-28
Payer: MEDICARE

## 2021-06-28 VITALS — BODY MASS INDEX: 24.46 KG/M2 | WEIGHT: 147 LBS

## 2021-06-30 ENCOUNTER — HOSPITAL ENCOUNTER (OUTPATIENT)
Dept: CARDIAC REHAB | Age: 76
Discharge: HOME | End: 2021-06-30
Payer: MEDICARE

## 2021-06-30 VITALS — WEIGHT: 140 LBS | BODY MASS INDEX: 23.3 KG/M2

## 2021-06-30 PROCEDURE — 93798 PHYS/QHP OP CAR RHAB W/ECG: CPT

## 2021-06-30 PROCEDURE — 93797 PHYS/QHP OP CAR RHAB WO ECG: CPT

## 2021-07-01 ENCOUNTER — HOSPITAL ENCOUNTER (OUTPATIENT)
Dept: CARDIAC REHAB | Age: 76
Discharge: HOME | End: 2021-07-01
Payer: MEDICARE

## 2021-07-01 VITALS — BODY MASS INDEX: 23.46 KG/M2 | WEIGHT: 141 LBS

## 2021-07-01 PROCEDURE — 93798 PHYS/QHP OP CAR RHAB W/ECG: CPT

## 2021-07-02 ENCOUNTER — HOSPITAL ENCOUNTER (OUTPATIENT)
Dept: CARDIAC REHAB | Age: 76
Discharge: HOME | End: 2021-07-02
Payer: MEDICARE

## 2021-07-02 VITALS — BODY MASS INDEX: 23.8 KG/M2 | WEIGHT: 143 LBS

## 2021-07-02 PROCEDURE — 93798 PHYS/QHP OP CAR RHAB W/ECG: CPT

## 2021-07-05 ENCOUNTER — APPOINTMENT (OUTPATIENT)
Dept: CARDIAC REHAB | Age: 76
End: 2021-07-05
Payer: MEDICARE

## 2021-07-07 ENCOUNTER — HOSPITAL ENCOUNTER (OUTPATIENT)
Dept: CARDIAC REHAB | Age: 76
Discharge: HOME | End: 2021-07-07
Payer: MEDICARE

## 2021-07-07 PROCEDURE — 93798 PHYS/QHP OP CAR RHAB W/ECG: CPT

## 2021-07-07 NOTE — PROGRESS NOTES
Outpatient Nutritional Counseling Order    Weston Mcknight is a 64y.o. year old female with Chronic systolic (congestive) heart failure [I50.22]. She is enrolled in cardiac rehabilitation and the treating clinician believes the patient would benefit from nutritional counseling services due to her diagnosis of Type 2 Diabetes. Please co-sign this note if you would like your patient to be evaluated by a Registered Dietician in our clinic. Thank you.

## 2021-07-09 ENCOUNTER — HOSPITAL ENCOUNTER (OUTPATIENT)
Dept: CARDIAC REHAB | Age: 76
Discharge: HOME | End: 2021-07-09
Payer: MEDICARE

## 2021-07-09 VITALS — WEIGHT: 140 LBS | BODY MASS INDEX: 23.3 KG/M2

## 2021-07-09 PROCEDURE — 93798 PHYS/QHP OP CAR RHAB W/ECG: CPT

## 2021-07-09 NOTE — PROGRESS NOTES
CARDIAC REHAB ITP REASSESSMENT FOR REVIEW AND SIGNATURE  Patient name: Rhianna Yu : 1945      Visits from Start of Care: 22                                                  Reporting Period: 6/10 to      Subjective Reports: Rapid weight gain of 7lb in one week during this recert period. Educated patient on low sodium diet and patient has lost the 7lb since that time          Goals Comments   1. Increase heart strength []? met                      [x]? not met  []? progressing Pt. Would like to increase EF from 29% to 35% by end of program. Pt. EF on last echo was 19%. 2. Walk faster    [x]? met                      []? not met  []? progressing Improve walking speed from 2.2 to 2.4 by next recert. Patient increased her walking speed from 1.9 to 2.2 during this recert period      Key functional changes: Pt. Has increased her walking speed from 1.9 to 2.2 during this recert period       Problems/ barriers to goal attainment: low sodium diet     Assessment / Recommendations: Continue w/ rehab     Levon Severin, RN 2021 4:06 PM    Cardiac ITP     CR PHASE II from 2021 in [place] [address]   Treatment Diagnosis 1 HF   HF Date 21   Referral Date 21   Significant Cardiovascular History Chronic atrial fibrillation, History of heart failure   Co-morbidities Diabetes, Pulmonary disease   ITP Visit Type Re-Assessment   1st Date of Exercise  21   ITP Next Review Date 07/10/21   Visit #/Total Visits /   EF % 19 %   Risk Stratification High   ITP Exercise, Psychosocial, Tobacco, Nutrition, Education   Stages of Change Action   Assisted Devices None   Mode Treadmill, Bike, Stepper, Ergometer   Frequency per week 3   Duration per session 35-45   Intensity  METS       1.5-2.7   RPE 11-13   Target Heart Rate 85-99   Resistance Training Yes   Resting BP 98/60   Peak /52   Is BP WDL?  Yes   Type None   Resistance Training No   Education Exercise safety, Signs/Symptoms to report, RPE scale, Equipment orientation, Warm up/Cool down, Physically active, Self pulse   Target Goal(s) Individual exercise RX, Aerobic activity 30 + minutes/day  5 days/week, BP < 140/90 or < 130/80, if DM or CKD   Patient Stated Exercise Goals increase heart strength, walk farther faster   Stages of Change Action   Interventions No intervention indicated   Currently Taking Psychotropic Meds No   Medication Changes No   Education Environmental triggers, Coping techniques, Impact self care behaviors on health, Relaxation techniques, Stress management class, Cardiac meds, Support groups   Target Goal(s) Engages in self-care behaviors, Demonstrates appropriate interaction with others, Assess presence or absence of depression using a valid screening tool, Positive support group, Maximizes coping skills   Uses Stress Mgmt Techniques No   Patient Stated Psychosocial Goals pt. will have consult with LCSW   Stages of Change Action   Diabetes Yes   HbA1C 7.5   BG at Home Yes   BG Frequency Occasional   Diabetes Med(s) metformin, amaryl   Diabetes Med(s) Change No   BG in Range?  Yes   Random    Date Lipids Drawn 03/22/21   Total 117   HDL 32   LDL 70   Triglycerides 73   Lipid Med(s) lipitor   Lipid Med Change(s) No   Weight  63.5 kg (140 lb)   Height  5' 5\" (1.651 m)   BMI 23.35   Weight Goal gain to 150   Waist Circumference  36   Alcohol Special   Nutrition Assessment Tool RYP   Dietitian Consult Yes   Nurse/Patient Discussion Yes   Nutrition Goals check sodium on nutrition facts   Nutrition Class Yes   Diabetes Education Referral No   Lipid Clinic Referral No   Weight Management Referral No   Education DM & CAD relationship, Low fat & cholesterol diet, Carb-controlled diet, Low sodium diet, Healthy eating, Signs/Symptoms Hypo/Hyperglycemia   Target Goals HbA1C less than 7 percent, Waist size less than 40 inches males and less than 35 inches for females   Patient Stated Nutrition Goals gain to [address] Ready to learn   Education Schedule Given No   Education CAD, Risk factors, Med Compliance, Cardiac A&P, Signs/Symptoms of Angina   Hypertension Yes   Hypertension Controlled Yes   Is BP WDL?  Yes   Med(s) Change No   BP Meds hydralazine, metropolol   Target Goals Medication compliance, Risk factors, Understand target guidelines for B/P, Understand target guidelines for lipids   Patient Stated Education Goals cardiac meds, nutrition   Exercise     CR PHASE II from 7/7/2021 in [facility]   Any problems changes since your last visit Denies   Any symptoms while exercising denies   Psychosocial/Stress Level 0   Resting EKG rhythm SR    Tobacco Use None   ITP Next Review Date 07/10/21   Visit Number/Total Visits 22/36   On Call Medical Director Immediately Available [facility]   Target Heart Rate(Range) 85-99   Resting HR 78   Resting BP 98/60   Recovery HR 79   Recovery /52   Weight 63.5 kg (140 lb)   Exercise EKG Rhythm SR w/rare PVCs   Exercise Duration 45   Peak HR 99   Peak RPE 15   Peak Mets 2.9   Asymptomatic yes   Total Minutes 55

## 2021-07-12 ENCOUNTER — APPOINTMENT (OUTPATIENT)
Dept: CARDIAC REHAB | Age: 76
End: 2021-07-12
Payer: MEDICARE

## 2021-07-14 ENCOUNTER — APPOINTMENT (OUTPATIENT)
Dept: CARDIAC REHAB | Age: 76
End: 2021-07-14
Payer: MEDICARE

## 2021-07-16 ENCOUNTER — APPOINTMENT (OUTPATIENT)
Dept: CARDIAC REHAB | Age: 76
End: 2021-07-16
Payer: MEDICARE

## 2021-07-19 ENCOUNTER — HOSPITAL ENCOUNTER (OUTPATIENT)
Dept: CARDIAC REHAB | Age: 76
Discharge: HOME | End: 2021-07-19
Payer: MEDICARE

## 2021-07-19 VITALS — WEIGHT: 141 LBS | BODY MASS INDEX: 23.46 KG/M2

## 2021-07-19 PROCEDURE — 93797 PHYS/QHP OP CAR RHAB WO ECG: CPT

## 2021-07-19 PROCEDURE — 93798 PHYS/QHP OP CAR RHAB W/ECG: CPT

## 2021-07-21 ENCOUNTER — HOSPITAL ENCOUNTER (OUTPATIENT)
Dept: CARDIAC REHAB | Age: 76
Discharge: HOME | End: 2021-07-21
Payer: MEDICARE

## 2021-07-21 VITALS — BODY MASS INDEX: 23.46 KG/M2 | WEIGHT: 141 LBS

## 2021-07-21 PROCEDURE — 93798 PHYS/QHP OP CAR RHAB W/ECG: CPT

## 2021-07-22 ENCOUNTER — HOSPITAL ENCOUNTER (OUTPATIENT)
Dept: CARDIAC REHAB | Age: 76
Discharge: HOME | End: 2021-07-22
Payer: MEDICARE

## 2021-07-22 VITALS — WEIGHT: 142 LBS | BODY MASS INDEX: 23.63 KG/M2

## 2021-07-22 PROCEDURE — 93798 PHYS/QHP OP CAR RHAB W/ECG: CPT

## 2021-07-23 ENCOUNTER — TRANSCRIBE ORDER (OUTPATIENT)
Dept: SCHEDULING | Age: 76
End: 2021-07-23

## 2021-07-23 DIAGNOSIS — K76.9 LIVER LESION: Primary | ICD-10-CM

## 2021-07-26 ENCOUNTER — HOSPITAL ENCOUNTER (OUTPATIENT)
Dept: CARDIAC REHAB | Age: 76
Discharge: HOME | End: 2021-07-26
Payer: MEDICARE

## 2021-07-26 VITALS — BODY MASS INDEX: 24.13 KG/M2 | WEIGHT: 145 LBS

## 2021-07-26 PROCEDURE — 93797 PHYS/QHP OP CAR RHAB WO ECG: CPT

## 2021-07-26 PROCEDURE — 93798 PHYS/QHP OP CAR RHAB W/ECG: CPT

## 2021-07-28 ENCOUNTER — HOSPITAL ENCOUNTER (OUTPATIENT)
Dept: CARDIAC REHAB | Age: 76
Discharge: HOME | End: 2021-07-28
Payer: MEDICARE

## 2021-07-28 VITALS — BODY MASS INDEX: 24.05 KG/M2 | WEIGHT: 144.5 LBS

## 2021-07-28 PROCEDURE — 93798 PHYS/QHP OP CAR RHAB W/ECG: CPT

## 2021-07-30 ENCOUNTER — HOSPITAL ENCOUNTER (OUTPATIENT)
Dept: CARDIAC REHAB | Age: 76
Discharge: HOME | End: 2021-07-30
Payer: MEDICARE

## 2021-07-30 VITALS — WEIGHT: 145 LBS | BODY MASS INDEX: 24.13 KG/M2

## 2021-07-30 PROCEDURE — 93798 PHYS/QHP OP CAR RHAB W/ECG: CPT

## 2021-08-02 ENCOUNTER — HOSPITAL ENCOUNTER (OUTPATIENT)
Dept: CARDIAC REHAB | Age: 76
Discharge: HOME | End: 2021-08-02
Payer: MEDICARE

## 2021-08-02 VITALS — BODY MASS INDEX: 24.13 KG/M2 | WEIGHT: 145 LBS

## 2021-08-02 PROCEDURE — 93798 PHYS/QHP OP CAR RHAB W/ECG: CPT

## 2021-08-04 ENCOUNTER — HOSPITAL ENCOUNTER (OUTPATIENT)
Dept: CARDIAC REHAB | Age: 76
Discharge: HOME | End: 2021-08-04
Payer: MEDICARE

## 2021-08-04 VITALS — BODY MASS INDEX: 23.8 KG/M2 | WEIGHT: 143 LBS

## 2021-08-04 PROCEDURE — 93798 PHYS/QHP OP CAR RHAB W/ECG: CPT

## 2021-08-06 ENCOUNTER — HOSPITAL ENCOUNTER (OUTPATIENT)
Dept: CARDIAC REHAB | Age: 76
Discharge: HOME | End: 2021-08-06
Payer: MEDICARE

## 2021-08-06 NOTE — PROGRESS NOTES
CARDIAC REHAB ITP REASSESSMENT FOR REVIEW AND SIGNATURE  Patient name: Nancy Torres : 1945      Visits from Start of Care: 32                                                  Reporting Period:  to      Subjective Reports: Progressing well, no complaints             Goals Comments   1. Increase heart strength []?? met                      [x]? ? not met  []? ? progressing Pt. Would like to increase EF from 29% to 35% by end of program. Pt. EF on last echo was 19%. Increase EF TO 35% by next echo   2. Walk faster    [x]? ? ENM                      []?? not met  []? ? progressing Improve walking speed from 2.4 to 2.6 by next recert. Patient increased her walking speed from 2.2 to 2.4 during this recert period (met recert goal)      Key functional changes: Pt. Has increased her walking speed from 2.2 to 2.4 during this recert period       Problems/ barriers to goal attainment: None     Assessment / Recommendations: Continue w/ rehab     Nina Goldberg RN 2021 9:44 AM    Cardiac ITP     CR PHASE II from 2021 in [place] [address]   Treatment Diagnosis 1 HF   HF Date 21   Referral Date 21   Significant Cardiovascular History Chronic atrial fibrillation, History of heart failure   Co-morbidities Diabetes, Pulmonary disease   ITP Visit Type Re-Assessment   1st Date of Exercise  21   ITP Next Review Date 21   Visit #/Total Visits 32/36   EF % 19 %   Risk Stratification High   ITP Exercise, Psychosocial, Tobacco, Nutrition, Education   Stages of Change Action   Assisted Devices None   Mode Treadmill, Bike, Stepper, Ergometer   Frequency per week 3   Duration per session 35-45   Intensity  METS       1.5-3.2   RPE 11-13   Target Heart Rate 85-99   Resistance Training Yes   Resting /69   Peak /69   Is BP WDL?  Yes   Type None   Resistance Training No   Education Exercise safety, Signs/Symptoms to report, RPE scale, Equipment orientation, Warm up/Cool down, Physically active, Self pulse   Target Goal(s) Individual exercise RX, Aerobic activity 30 + minutes/day  5 days/week, BP < 140/90 or < 130/80, if DM or CKD   Patient Stated Exercise Goals increase heart strength, walk farther faster   Stages of Change Action   Interventions No intervention indicated   Currently Taking Psychotropic Meds No   Medication Changes No   Education Environmental triggers, Coping techniques, Impact self care behaviors on health, Relaxation techniques, Stress management class, Cardiac meds, Support groups   Target Goal(s) Engages in self-care behaviors, Demonstrates appropriate interaction with others, Assess presence or absence of depression using a valid screening tool, Positive support group, Maximizes coping skills   Uses Stress Mgmt Techniques No   Patient Stated Psychosocial Goals pt. will have consult with LCSW   Stages of Change Action   Diabetes Yes   HbA1C 7.5   BG at Home Yes   BG Frequency Occasional   Diabetes Med(s) metformin, amaryl   Diabetes Med(s) Change No   BG in Range?  Yes   Random    Date Lipids Drawn 03/22/21   Total 117   HDL 32   LDL 70   Triglycerides 73   Lipid Med(s) lipitor   Lipid Med Change(s) No   Weight  64.9 kg (143 lb)   Height  5' 5\" (1.651 m)   BMI 23.85   Weight Goal gain to 150   Waist Circumference  36   Alcohol Special   Nutrition Assessment Tool RYP   Dietitian Consult Yes   Nurse/Patient Discussion Yes   Nutrition Goals check sodium on nutrition facts   Nutrition Class Yes   Diabetes Education Referral No   Lipid Clinic Referral No   Weight Management Referral No   Education DM & CAD relationship, Low fat & cholesterol diet, Carb-controlled diet, Low sodium diet, Healthy eating   Target Goals HbA1C less than 7 percent, Waist size less than 40 inches males and less than 35 inches for females   Patient Stated Nutrition Goals gain to 150   Learning Barrier Ready to learn   Education Schedule Given No   Education CAD, Risk factors, Med Compliance, Cardiac A&P, Signs/Symptoms of Angina   Hypertension Yes   Hypertension Controlled Yes   Is BP WDL?  Yes   Med(s) Change No   BP Meds hydralazine, metropolol   Target Goals Medication compliance, Risk factors, Understand target guidelines for B/P, Understand target guidelines for lipids   Patient Stated Education Goals cardiac meds, nutrition   Exercise     CR PHASE II from 8/4/2021 in [facility]   Any problems changes since your last visit Denies   Any symptoms while exercising denies   Psychosocial/Stress Level 0   Resting EKG rhythm SR   Tobacco Use None   ITP Next Review Date 09/05/21   Visit Number/Total Visits 32/36   On Call Medical Director Immediately Available [facility]   Target Heart Rate(Range) 85-99   Resting HR 72   Resting /69   Recovery HR 86   Recovery /58   Weight 64.9 kg (143 lb)   Exercise EKG Rhythm SR/ST w/rare PVCs   Exercise Duration 45   Peak    Peak RPE 14   Peak Mets 3.2   Asymptomatic yes   Total Minutes 55

## 2021-08-06 NOTE — PROGRESS NOTES
Pt. Came into cardiac rehab today with low blood pressure. Pt. Asymptomatic and HR in the 70's SR w/BBB w/rare PVCs. BP taken 4 times with readings: 88/56, 81/56, 80/50, and 87/56. Pt. States she has not had any water to drink today. Pt. Was given 3 cups of water and BP remained low. Advised pt. Not to exercise today. Pt. Daughter came to give patient a ride home from rehab.

## 2021-08-09 ENCOUNTER — HOSPITAL ENCOUNTER (OUTPATIENT)
Dept: CARDIAC REHAB | Age: 76
Discharge: HOME | End: 2021-08-09
Payer: MEDICARE

## 2021-08-09 VITALS — BODY MASS INDEX: 23.8 KG/M2 | WEIGHT: 143 LBS

## 2021-08-09 PROCEDURE — 93798 PHYS/QHP OP CAR RHAB W/ECG: CPT

## 2021-08-10 ENCOUNTER — HOSPITAL ENCOUNTER (OUTPATIENT)
Dept: CARDIAC REHAB | Age: 76
Discharge: HOME | End: 2021-08-10
Payer: MEDICARE

## 2021-08-10 VITALS — WEIGHT: 144 LBS | BODY MASS INDEX: 23.96 KG/M2

## 2021-08-10 PROCEDURE — 93798 PHYS/QHP OP CAR RHAB W/ECG: CPT

## 2021-08-11 ENCOUNTER — HOSPITAL ENCOUNTER (OUTPATIENT)
Dept: CARDIAC REHAB | Age: 76
Discharge: HOME | End: 2021-08-11
Payer: MEDICARE

## 2021-08-11 VITALS — WEIGHT: 143 LBS | BODY MASS INDEX: 23.8 KG/M2

## 2021-08-11 PROCEDURE — 93798 PHYS/QHP OP CAR RHAB W/ECG: CPT

## 2021-08-13 ENCOUNTER — HOSPITAL ENCOUNTER (OUTPATIENT)
Dept: CARDIAC REHAB | Age: 76
Discharge: HOME | End: 2021-08-13
Payer: MEDICARE

## 2021-08-13 VITALS — WEIGHT: 143 LBS | BODY MASS INDEX: 23.8 KG/M2

## 2021-08-13 PROCEDURE — 93798 PHYS/QHP OP CAR RHAB W/ECG: CPT

## 2021-08-13 NOTE — PROGRESS NOTES
COMPLETE CARDIAC REHAB DISCHARGE NOTE FOR REVIEW AND SIGNATURE    Lu Mike Zoila Paulson has completed phase II cardiac rehab and attended 36 sessions from 5/12-8/13. Garima Martin is interested in maintaining optimal health and will work with Dr. Oracio Peters MD.  She has improved her endurance and stamina through regular exercise during the program. She lost 1 lb. Blood pressure is 116/69 and is WNL. She has also improved her Rate Your Plate score and this was reviewed with patient. Her MET level increased on her six minute walk test.     Garima Martin has met her recert goal of increasing her treadmill speed and plans to continue exercising (at home, gym, etc). She has set revised goals that include cardio equipment, light weights and walking 3 to 5 times a week. Greg Castillo RN  8/13/2021     Cardiac ITP     CR PHASE II from 8/13/2021 in [place] [address]   Treatment Diagnosis 1 HF   HF Date 03/18/21   Referral Date 04/22/21   Significant Cardiovascular History Chronic atrial fibrillation, History of heart failure   Co-morbidities Diabetes, Pulmonary disease   ITP Visit Type Re-Assessment   1st Date of Exercise  05/12/21   ITP Next Review Date 09/05/21   Visit #/Total Visits 36/36   EF % 19 %   Risk Stratification High   ITP Exercise, Psychosocial, Tobacco, Nutrition, Education   Stages of Change Maintenance   DASI Total Score 32.95   Assisted Devices None   Test Six minute walk test   Mode Treadmill, Bike, Stepper, Ergometer   Frequency per week 3   Duration per session 35-45   Intensity  METS       1.5-3.2   RPE 11-13   Target Heart Rate 85-99   Resistance Training Yes   Resting /69   Peak /71   Is BP WDL?  Yes   Type None   Resistance Training No   Education Exercise safety, Signs/Symptoms to report, RPE scale, Equipment orientation, Warm up/Cool down, Physically active, Self pulse   Target Goal(s) Individual exercise RX, Aerobic activity 30 + minutes/day  5 days/week, BP < 140/90 or < 130/80, if DM or CKD   Patient Stated Exercise Goals increase heart strength, walk farther faster   Stages of Change Maintenance   [facility] Total Score 16   PHQ 9 Score 1   Medication Changes No   Education Environmental triggers, Coping techniques, Impact self care behaviors on health, Relaxation techniques, Stress management class, Cardiac meds, Support groups   Target Goal(s) Engages in self-care behaviors, Demonstrates appropriate interaction with others, Assess presence or absence of depression using a valid screening tool, Positive support group, Maximizes coping skills   Uses Stress Mgmt Techniques No   Patient Stated Psychosocial Goals pt. will have consult with LCSW   Stages of Change Maintenance   Diabetes Yes   HbA1C 7.5   BG at Home Yes   BG Frequency Occasional   Diabetes Med(s) metformin, amaryl   Diabetes Med(s) Change No   BG in Range? Yes   Random    Date Lipids Drawn 03/22/21   Total 117   HDL 32   LDL 70   Triglycerides 73   Lipid Med(s) lipitor   Lipid Med Change(s) No   Weight  64.9 kg (143 lb)   Height  5' 5\" (1.651 m)   BMI 23.85   Weight Goal gain to 150   Waist Circumference  36   Alcohol Special   Nutrition Assessment Tool RYP   Rate Your Plate Total Score 54   Dietitian Consult Yes   Nurse/Patient Discussion Yes   Nutrition Goals check sodium on nutrition facts   Nutrition Class Yes   Diabetes Education Referral No   Lipid Clinic Referral No   Weight Management Referral No   Education DM & CAD relationship, Low fat & cholesterol diet, Carb-controlled diet, Low sodium diet, Healthy eating   Target Goals HbA1C less than 7 percent, Waist size less than 40 inches males and less than 35 inches for females   Patient Stated Nutrition Goals gain to 150   Learning Barrier Ready to learn   Education Schedule Given No   Education CAD, Risk factors, Med Compliance, Cardiac A&P, Signs/Symptoms of Angina   Hypertension Yes   Hypertension Controlled Yes   Is BP WDL?  Yes   Med(s) Change No   BP Meds hydralazine, metropolol   Target Goals Medication compliance, Risk factors, Understand target guidelines for B/P, Understand target guidelines for lipids   Patient Stated Education Goals cardiac meds, nutrition   Exercise     CR PHASE II from 8/13/2021 in [facility]   Any problems changes since your last visit Denies   Any symptoms while exercising denies   Psychosocial/Stress Level 0   Resting EKG rhythm SR w/BBB   Tobacco Use None   ITP Next Review Date 09/05/21   Visit Number/Total Visits 36/36   On Call Medical Director Immediately Available [facility]   Target Heart Rate(Range) 85-99   Resting HR 73   Resting /69   Recovery HR 78   Recovery /67   Weight 64.9 kg (143 lb)   Exercise EKG Rhythm SR/ST w/rare PVCs   Exercise Duration 36   Peak    Peak RPE 13   Peak Mets 3.2   Asymptomatic yes   Total Minutes 46

## 2022-02-18 ENCOUNTER — TRANSCRIBE ORDER (OUTPATIENT)
Dept: SCHEDULING | Age: 77
End: 2022-02-18

## 2022-02-18 DIAGNOSIS — I82.403 DVT OF LOWER EXTREMITY, BILATERAL (HCC): Primary | ICD-10-CM

## 2022-02-28 ENCOUNTER — HOSPITAL ENCOUNTER (OUTPATIENT)
Dept: VASCULAR SURGERY | Age: 77
Discharge: HOME | End: 2022-02-28
Attending: INTERNAL MEDICINE
Payer: MEDICARE

## 2022-02-28 DIAGNOSIS — I82.403 DVT OF LOWER EXTREMITY, BILATERAL (HCC): ICD-10-CM

## 2022-02-28 PROCEDURE — 93971 EXTREMITY STUDY: CPT

## 2022-09-20 ENCOUNTER — TRANSCRIBE ORDER (OUTPATIENT)
Dept: SCHEDULING | Age: 77
End: 2022-09-20

## 2022-09-20 DIAGNOSIS — I63.033 CEREBRAL INFARCTION DUE TO BILATERAL THROMBOSIS OF CAROTID ARTERIES (HCC): Primary | ICD-10-CM

## 2022-09-20 DIAGNOSIS — G40.019 BENIGN ROLANDIC EPILEPSY, INTRACTABLE (HCC): Primary | ICD-10-CM

## 2022-10-26 ENCOUNTER — TRANSCRIBE ORDER (OUTPATIENT)
Dept: SCHEDULING | Age: 77
End: 2022-10-26

## 2022-10-26 DIAGNOSIS — G40.019 BENIGN ROLANDIC EPILEPSY, INTRACTABLE (HCC): ICD-10-CM

## 2022-10-26 DIAGNOSIS — I63.033 CEREBRAL INFARCTION DUE TO BILATERAL THROMBOSIS OF CAROTID ARTERIES (HCC): Primary | ICD-10-CM

## 2022-12-14 ENCOUNTER — HOSPITAL ENCOUNTER (OUTPATIENT)
Dept: LAB | Age: 77
Discharge: HOME | End: 2022-12-14
Payer: MEDICARE

## 2022-12-14 ENCOUNTER — TRANSCRIBE ORDER (OUTPATIENT)
Dept: REGISTRATION | Age: 77
End: 2022-12-14

## 2022-12-14 ENCOUNTER — HOSPITAL ENCOUNTER (OUTPATIENT)
Dept: MRI IMAGING | Age: 77
Discharge: HOME | End: 2022-12-14
Payer: MEDICARE

## 2022-12-14 ENCOUNTER — HOSPITAL ENCOUNTER (OUTPATIENT)
Dept: NEUROLOGY | Age: 77
Discharge: HOME | End: 2022-12-14
Payer: MEDICARE

## 2022-12-14 DIAGNOSIS — N18.4 CHRONIC KIDNEY DISEASE, STAGE IV (SEVERE) (HCC): ICD-10-CM

## 2022-12-14 DIAGNOSIS — I63.033 CEREBRAL INFARCTION DUE TO BILATERAL THROMBOSIS OF CAROTID ARTERIES (HCC): ICD-10-CM

## 2022-12-14 DIAGNOSIS — G50.0 TRIGEMINAL NEURALGIA: Primary | ICD-10-CM

## 2022-12-14 DIAGNOSIS — G40.019 BENIGN ROLANDIC EPILEPSY, INTRACTABLE (HCC): ICD-10-CM

## 2022-12-14 DIAGNOSIS — G25.0 BENIGN ESSENTIAL TREMOR: ICD-10-CM

## 2022-12-14 DIAGNOSIS — E11.9 DIABETES MELLITUS (HCC): Primary | ICD-10-CM

## 2022-12-14 DIAGNOSIS — G60.3 IDIOPATHIC PROGRESSIVE POLYNEUROPATHY: ICD-10-CM

## 2022-12-14 DIAGNOSIS — I10 ESSENTIAL HYPERTENSION, MALIGNANT: ICD-10-CM

## 2022-12-14 DIAGNOSIS — G50.0 TRIGEMINAL NEURALGIA: ICD-10-CM

## 2022-12-14 DIAGNOSIS — E11.9 DIABETES MELLITUS (HCC): ICD-10-CM

## 2022-12-14 LAB
ALBUMIN SERPL-MCNC: 3.4 G/DL (ref 3.4–5)
ANION GAP SERPL CALC-SCNC: 8 MMOL/L (ref 3–18)
BASOPHILS # BLD: 0 K/UL (ref 0–0.1)
BASOPHILS NFR BLD: 0 % (ref 0–2)
BUN SERPL-MCNC: 90 MG/DL (ref 7–18)
BUN/CREAT SERPL: 31 (ref 12–20)
CALCIUM SERPL-MCNC: 9.4 MG/DL (ref 8.5–10.1)
CALCIUM SERPL-MCNC: 9.5 MG/DL (ref 8.5–10.1)
CHLORIDE SERPL-SCNC: 106 MMOL/L (ref 100–111)
CO2 SERPL-SCNC: 26 MMOL/L (ref 21–32)
CREAT SERPL-MCNC: 2.9 MG/DL (ref 0.6–1.3)
DIFFERENTIAL METHOD BLD: ABNORMAL
EOSINOPHIL # BLD: 0.6 K/UL (ref 0–0.4)
EOSINOPHIL NFR BLD: 6 % (ref 0–5)
ERYTHROCYTE [DISTWIDTH] IN BLOOD BY AUTOMATED COUNT: 12.9 % (ref 11.6–14.5)
ERYTHROCYTE [SEDIMENTATION RATE] IN BLOOD: 91 MM/HR (ref 0–30)
FOLATE SERPL-MCNC: 9.1 NG/ML (ref 3.1–17.5)
GLUCOSE SERPL-MCNC: 103 MG/DL (ref 74–99)
HCT VFR BLD AUTO: 33.1 % (ref 35–45)
HGB BLD-MCNC: 10.6 G/DL (ref 12–16)
IMM GRANULOCYTES # BLD AUTO: 0.1 K/UL (ref 0–0.04)
IMM GRANULOCYTES NFR BLD AUTO: 1 % (ref 0–0.5)
LYMPHOCYTES # BLD: 1.8 K/UL (ref 0.9–3.6)
LYMPHOCYTES NFR BLD: 20 % (ref 21–52)
MCH RBC QN AUTO: 29.4 PG (ref 24–34)
MCHC RBC AUTO-ENTMCNC: 32 G/DL (ref 31–37)
MCV RBC AUTO: 91.9 FL (ref 78–100)
MONOCYTES # BLD: 0.7 K/UL (ref 0.05–1.2)
MONOCYTES NFR BLD: 8 % (ref 3–10)
NEUTS SEG # BLD: 6.2 K/UL (ref 1.8–8)
NEUTS SEG NFR BLD: 66 % (ref 40–73)
NRBC # BLD: 0 K/UL (ref 0–0.01)
NRBC BLD-RTO: 0 PER 100 WBC
PHOSPHATE SERPL-MCNC: 3.7 MG/DL (ref 2.5–4.9)
PLATELET # BLD AUTO: 305 K/UL (ref 135–420)
PMV BLD AUTO: 9.6 FL (ref 9.2–11.8)
POTASSIUM SERPL-SCNC: 5.3 MMOL/L (ref 3.5–5.5)
PTH-INTACT SERPL-MCNC: 267.1 PG/ML (ref 18.4–88)
RBC # BLD AUTO: 3.6 M/UL (ref 4.2–5.3)
SODIUM SERPL-SCNC: 140 MMOL/L (ref 136–145)
TSH SERPL DL<=0.05 MIU/L-ACNC: 2.39 UIU/ML (ref 0.36–3.74)
VIT B12 SERPL-MCNC: 288 PG/ML (ref 211–911)
WBC # BLD AUTO: 9.4 K/UL (ref 4.6–13.2)

## 2022-12-14 PROCEDURE — 85652 RBC SED RATE AUTOMATED: CPT

## 2022-12-14 PROCEDURE — 85025 COMPLETE CBC W/AUTO DIFF WBC: CPT

## 2022-12-14 PROCEDURE — 95819 EEG AWAKE AND ASLEEP: CPT

## 2022-12-14 PROCEDURE — 36415 COLL VENOUS BLD VENIPUNCTURE: CPT

## 2022-12-14 PROCEDURE — 83970 ASSAY OF PARATHORMONE: CPT

## 2022-12-14 PROCEDURE — 82607 VITAMIN B-12: CPT

## 2022-12-14 PROCEDURE — 86780 TREPONEMA PALLIDUM: CPT

## 2022-12-14 PROCEDURE — 84443 ASSAY THYROID STIM HORMONE: CPT

## 2022-12-14 PROCEDURE — 80069 RENAL FUNCTION PANEL: CPT

## 2022-12-14 PROCEDURE — 70551 MRI BRAIN STEM W/O DYE: CPT

## 2022-12-14 PROCEDURE — 86225 DNA ANTIBODY NATIVE: CPT

## 2022-12-15 LAB
CENTROMERE B AB SER-ACNC: 1.3 AI (ref 0–0.9)
CHROMATIN AB SERPL-ACNC: <0.2 AI (ref 0–0.9)
DSDNA AB SER-ACNC: <1 IU/ML (ref 0–9)
ENA JO1 AB SER-ACNC: <0.2 AI (ref 0–0.9)
ENA RNP AB SER-ACNC: <0.2 AI (ref 0–0.9)
ENA SCL70 AB SER-ACNC: <0.2 AI (ref 0–0.9)
ENA SM AB SER-ACNC: <0.2 AI (ref 0–0.9)
ENA SS-A AB SER-ACNC: <0.2 AI (ref 0–0.9)
ENA SS-B AB SER-ACNC: <0.2 AI (ref 0–0.9)
SEE BELOW, 164869: ABNORMAL

## 2022-12-23 NOTE — PROCEDURES
[address] [place] [address] Dr ROWE    Name:  Denis Guerrier  MR#:   [id number]  :  1945  ACCOUNT #:  [de-identified]  DATE OF SERVICE:  2022    EEG NUMBER:   EEG     OUTPATIENT RECORDING:  EEG was ordered by Carroll Alvarez MD    REASON FOR EEG:  Evaluation of memory loss. MEDICATIONS:  Medications at the time of recording include Eliquis, aspirin, Amaryl, hydralazine, metoprolol, Aldactone, atorvastatin. EEG technique used was standard 10/20 system with 16-channel recording and an EKG. Activation procedure used was photic stimulation. Total duration of the recording was 26 minutes. This is an awake and drowsy EEG recording. EEG REPORT:  The background consists of posterior-dominant alpha rhythms at a frequency of 8 Hz and attenuate to eye opening. No significant asymmetry. There are no obvious spikes or sharp wave discharges. There are intermittent mainly delta range diffuse slow waves seen. Photic stimulation and drowsiness did not induce any abnormal discharges. IMPRESSION:  This is an abnormal EEG with the presence of intermittent slow waves which are generalized. CLINICAL CORRELATION:  The slow waves might indicate encephalopathy either from metabolic or toxic insult or could also be from a diffuse neurodegenerative process.       Sai Aviles MD      [id number]  D:  2022 8:21  T:  2022 9:34  JOB #:  [id number]

## 2023-01-23 ENCOUNTER — DOCUMENTATION ONLY (OUTPATIENT)
Dept: NEPHROLOGY | Age: 78
End: 2023-01-23

## 2023-01-23 NOTE — PROGRESS NOTES
Saúl Jennings  Appointment: 2022 2:30 PM  Location: [facility] Office  Patient #: [id number]  : 1945  Undefined / Language: [place] / Race: Black or   Female      History of Present Illness Gurwinder Cook MD; 2022 6:19 AM)  The patient is a 76year old female who presents with chronic kidney disease. This is classified as stage 4. Note: Patient is a 38-year-old female who has been referred for evaluation of chronic kidney disease    Past medical history:    #1 diabetes mellitus for more than 10 years, recent hemoglobin A1c of 6.7 improved from 9.3 in the past  #2 hypertension longstanding, presently better controlled  #3 history of congestive heart failure follows with cardiology, presently on Bumex 1 mg twice daily for volume management  #4 history of atrial fibrillation  #5 recent rash on extremity and so lisinopril has been held  #6 history of hyperkalemia    Patient has been referred for management of CKD creatinine has been elevated now and 2.12, was 2.3 in 2022. Denies any urinary symptoms, extremity edema. Weight has been fairly stable and denies shortness of breath on exertion orthopnea. Hemoglobin is good at 10.8. Do not have urine analysis to evaluate for proteinuria. PTH is slightly elevated at 92 hemoglobin A1c of 6.7. Patient had a renal ultrasound done which showed increased bilateral renal cortical echogenicity suggestive of chronic medical renal disease.   4.3 cm right renal cortical cyst without any suspicious features    Problem List/Past Medical (William Witts; 2022 2:07 PM)  DM type 2 (diabetes mellitus, type 2) (E11.9)    Iron deficiency anemia (D50.9)    Chronic kidney disease, stage 4 (severe) (N18.4)    HTN (hypertension) (I10)    Hyperparathyroidism (E21.3)    Aortic atherosclerosis (I70.0)    Dyslipidemia (E78.5)    Dementia (F03.90)    Problems Reconciled      Allergies (Janell Rice; 2022 2:07 PM)  No Known Drug Allergies [10/18/2022]: Allergies Reconciled      Social History (Zaki Mcgraw; 12/7/2022 2:18 PM)  Non Drinker/No Alcohol Use    No Drug Use    Tobacco use   Never smoker. Medication History (Zaki Mcgraw; 12/7/2022 2:23 PM)  Atorvastatin Calcium  (40MG Tablet, 1 Oral at bedtime) Active. Bumetanide  (1MG Tablet, 1 Oral two times daily) Active. Eliquis  (2.5MG Tablet, 1 Oral two times daily) Active. glipiZIDE ER  (2.5MG Tablet ER 24HR, 1 Oral daily) Active. Aspir-Low  (81MG Tablet DR, 1 Oral daily) Active. (on hold for now)  Lisinopril  (5MG Tablet, 1 Oral daily) Active. (on hold for now)  Medications Reconciled     Health Maintenance History (Zaki Mcgraw; 12/7/2022 2:19 PM)  Flu Vaccine   [09/2022]:  Pneumovax   Refused. Review of Systems Terrie Johnston MD; 12/8/2022 6:20 AM)  General Not Present- Anorexia, Chills, Fatigue and Fever. Skin Not Present- Bruising, Pruritus, Rash and Ulcer. HEENT Not Present- Dry Mucous Membranes, Dysgeusia, Oral Ulcers, Periorbital Puffiness and Sore Throat. Respiratory Not Present- Cough, Difficulty Breathing on Exertion, Dyspnea and Hemoptysis. Cardiovascular Not Present- Chest Pain, Claudications, Orthopnea, Palpitations, Paroxysmal Nocturnal Dyspnea and Swelling of Extremities. Gastrointestinal Not Present- Abdominal Pain, Abdominal Swelling, Constipation, Diarrhea, Hematochezia, Melena, Nausea and Vomiting. Female Genitourinary Not Present- Blood in Urine, Difficulty Emptying Bladder, Dysuria, Frequency, Hematuria and Nocturia. Musculoskeletal Not Present- Joint Pain, Joint Redness, Joint Stiffness, Joint Swelling, Leg Cramps and Myalgia. Neurological Not Present- Dizziness, Headaches, Syncope and Trouble walking. Endocrine Not Present- Appetite Changes, Excessive Thirst, Polydipsia and Polyuria. Hematology Not Present- Easy Bruising and Excessive bleeding.     Vitals (Zaki Mcgraw; 12/7/2022 2:24 PM)  12/7/2022 2:23 PM  Weight: 146 lb   Height: 65 in   Body Surface Area: 1.73 m²   Body Mass Index: 24.3 kg/m²    Pain Level: 0/10    Temp.: 97.3° F    Pulse: 85 (Regular)    Resp.: 12 (Unlabored)    P. OX: 96% (Room air)  BP: 110/60(Sitting, Left Arm, Standard)              Physical Exam Lauren Brown MD; 12/8/2022 6:20 AM)  General  General Appearance - Not in acute distress. Build & Nutrition - Well nourished and Well developed. Integumentary  General Characteristics  Overall examination of the patient's skin reveals - no rashes. Eye  Sclera/Conjunctiva - Bilateral - Nonicteric. ENMT  Mouth and Throat  Oral Cavity/Oropharynx - Gingiva - no ulcerations noted, No excessive growth of soft tissue present. Oral Mucosa - moist. Oropharynx - No presence of white exudate noted. Chest and Lung Exam  Auscultation  Breath sounds - Normal and Clear. Adventitious sounds - No Adventitious sounds. Cardiovascular  Cardiovascular examination reveals  - on palpation PMI is normal in location and amplitude, no palpable S3 or S4. Normal cardiac borders. , normal heart sounds, regular rate and rhythm with no murmurs, carotid auscultation reveals no bruits, abdominal aorta auscultation reveals no bruits, normal pedal pulses bilaterally and no digital clubbing, cyanosis, edema, increased warmth or tenderness. Abdomen  Inspection  Inspection of the abdomen reveals - No Abnormal pulsations. Palpation/Percussion  Palpation and Percussion of the abdomen reveal - Soft, Non Tender, No hepatosplenomegaly and No Palpable abdominal masses. Auscultation  Auscultation of the abdomen reveals - Bowel sounds normal and No Abdominal bruits. Neurologic  Neurologic evaluation reveals  - alert and oriented x 3 with no impairment of recent or remote memory. Lymphatic  General Lymphatics  Description - No Cervical lymphadeopathy.         Assessment & Plan Lauren Brown MD; 12/8/2022 6:20 AM)    Chronic kidney disease, stage 4 (severe) (N18.4) <[id number]>  Impression: #1 chronic kidney disease stage IV, likely etiology diabetic nephropathy, may have hypertension nephrosclerosis as well. Creatinine of 2.12, EGFR 23. No urine studies available for proteinuria. No urinary symptoms or edema. Electrolytes and acid-base in good range. Renal ultrasound with right renal cyst.  #2 hypertension, at goal, lisinopril has been held  #3 history of hyperkalemia potassium presently in good range  #4 secondary hyperparathyroidism, PTH elevated, calcium in good range. Alkaline phosphatase slightly high at 286 but improved from last visit. Monitor PTH on follow-up  #5 anemia hemoglobin at goal, MCV 89, check iron studies on follow-up  #6 history of congestive heart failure, continue Bumex  #7 right-sided renal cortical cysts    Plan:    #1 low-salt diet and fluid restriction of 1500 mL daily  #2 Bumex 1 mg twice daily  #3 Daily weights  #4 monitor blood pressures at home, goal less than 130/80  #5 if rash gets better, consider restarting lisinopril  #6 check urine studies including urine analysis microscopy, urine protein creatinine ratio and urine microalbumin creatinine ratio  #7 check PTH on follow-up  #8 counseled regarding avoiding NSAIDs    Discussed plan with the patient  Follow-up in 4 months time with CKD labs    Current Plans  PTH INTACT (19276)  RENAL FUNCTION PANEL (20840)    HTN (hypertension) (I10)    Current Plans  CBC (61806)    DM type 2 (diabetes mellitus, type 2) (E11.9) <HCC19>    Current Plans  MICROALBUMIN / CREATININE RATIO (36958)  SPOT PROTEIN URINE (25132)  SPOT URINE CREATININE(91959)    Iron deficiency anemia (D50.9)      Portal Access Education (Z71.9)    Current Plans  Pt Education - How to [facility] using Patient Portal and 3rd Party Apps: discussed with patient and provided information.   Signed by Rhianna Guo MD (12/8/2022 6:20 AM)

## 2023-01-31 DIAGNOSIS — I63.033 CEREBRAL INFARCTION DUE TO BILATERAL THROMBOSIS OF CAROTID ARTERIES (HCC): Primary | ICD-10-CM

## 2023-02-04 DIAGNOSIS — I63.033 CEREBRAL INFARCTION DUE TO BILATERAL THROMBOSIS OF CAROTID ARTERIES (HCC): Primary | ICD-10-CM

## 2023-03-08 ENCOUNTER — TELEPHONE (OUTPATIENT)
Age: 78
End: 2023-03-08

## 2023-03-08 NOTE — TELEPHONE ENCOUNTER
Ms. Hunter Patricio daughter called requesting to cancel her mom, Ms. Les Whaley, appointment scheduled on March 16, 2023 with Dr. Braxton Cody. Indicate will call back to reschedule appointment when Ms. Hunter Patricio is feel up to rescheduling.

## 2023-03-20 ENCOUNTER — HOSPITAL ENCOUNTER (OUTPATIENT)
Facility: HOSPITAL | Age: 78
Discharge: HOME | End: 2023-03-23

## 2023-03-20 DIAGNOSIS — I10 HYPERTENSION, UNSPECIFIED TYPE: ICD-10-CM

## 2023-03-20 DIAGNOSIS — E11.69 TYPE 2 DIABETES MELLITUS WITH OTHER SPECIFIED COMPLICATION, UNSPECIFIED WHETHER LONG TERM INSULIN USE (HCC): ICD-10-CM

## 2023-03-20 DIAGNOSIS — N18.4 CHRONIC RENAL DISEASE, STAGE IV (HCC): ICD-10-CM

## 2023-04-29 LAB
FERRITIN SERPL-MCNC: 684 NG/ML (ref 15–150)
IRON SATN MFR SERPL: 10 % (ref 15–55)
IRON SERPL-MCNC: 20 UG/DL (ref 27–139)
POTASSIUM SERPL-SCNC: 3.9 MMOL/L (ref 3.5–5.2)
TIBC SERPL-MCNC: 193 UG/DL (ref 250–450)
UIBC SERPL-MCNC: 173 UG/DL (ref 118–369)

## 2023-06-14 NOTE — PROGRESS NOTES
Pt. Came into rehab today and pt. HR was elevated in the 130's/140's which is well above her target HR doing moderate intensity exercise. Stopped the patient's exercise. Pt. Denied shortness of breath or chest pain. Pt. Stated that she gained 10lb over the last week. Pt. Weight in rehab showed that she had gained 7lb over the last week. Pt. Stated that during her vacation she ate a lot of food and she was unaware of her sodium intake. Advised pt. not to exercise today and to call her cardiology office to advise office of her recent weight gain. No

## 2024-09-23 ENCOUNTER — CLINICAL DOCUMENTATION (OUTPATIENT)
Facility: HOSPITAL | Age: 79
End: 2024-09-23

## 2024-10-14 ENCOUNTER — APPOINTMENT (OUTPATIENT)
Facility: HOSPITAL | Age: 79
End: 2024-10-14
Payer: MEDICARE

## 2024-10-14 ENCOUNTER — HOSPITAL ENCOUNTER (INPATIENT)
Facility: HOSPITAL | Age: 79
LOS: 3 days | Discharge: HOME | End: 2024-10-18
Attending: STUDENT IN AN ORGANIZED HEALTH CARE EDUCATION/TRAINING PROGRAM | Admitting: HOSPITALIST
Payer: MEDICARE

## 2024-10-14 DIAGNOSIS — N17.9 AKI (ACUTE KIDNEY INJURY) (HCC): Primary | ICD-10-CM

## 2024-10-14 DIAGNOSIS — I95.1 ORTHOSTATIC HYPOTENSION: ICD-10-CM

## 2024-10-14 LAB
ALBUMIN SERPL-MCNC: 2.8 G/DL (ref 3.4–5)
ALBUMIN/GLOB SERPL: 0.6 (ref 0.8–1.7)
ALP SERPL-CCNC: 172 U/L (ref 45–117)
ALT SERPL-CCNC: 54 U/L (ref 13–56)
ANION GAP SERPL CALC-SCNC: 11 MMOL/L (ref 3–18)
AST SERPL-CCNC: 46 U/L (ref 10–38)
BASOPHILS # BLD: 0 K/UL (ref 0–0.1)
BASOPHILS NFR BLD: 0 % (ref 0–2)
BILIRUB SERPL-MCNC: 1 MG/DL (ref 0.2–1)
BUN SERPL-MCNC: 101 MG/DL (ref 7–18)
BUN/CREAT SERPL: 22 (ref 12–20)
CALCIUM SERPL-MCNC: 8.2 MG/DL (ref 8.5–10.1)
CHLORIDE SERPL-SCNC: 88 MMOL/L (ref 100–111)
CO2 SERPL-SCNC: 32 MMOL/L (ref 21–32)
CREAT SERPL-MCNC: 4.65 MG/DL (ref 0.6–1.3)
DIFFERENTIAL METHOD BLD: ABNORMAL
EOSINOPHIL # BLD: 0.1 K/UL (ref 0–0.4)
EOSINOPHIL NFR BLD: 1 % (ref 0–5)
ERYTHROCYTE [DISTWIDTH] IN BLOOD BY AUTOMATED COUNT: 18.3 % (ref 11.6–14.5)
GLOBULIN SER CALC-MCNC: 4.6 G/DL (ref 2–4)
GLUCOSE SERPL-MCNC: 206 MG/DL (ref 74–99)
HCT VFR BLD AUTO: 33.5 % (ref 35–45)
HGB BLD-MCNC: 11 G/DL (ref 12–16)
IMM GRANULOCYTES # BLD AUTO: 0 K/UL (ref 0–0.04)
IMM GRANULOCYTES NFR BLD AUTO: 0 % (ref 0–0.5)
LYMPHOCYTES # BLD: 0.8 K/UL (ref 0.9–3.6)
LYMPHOCYTES NFR BLD: 12 % (ref 21–52)
MCH RBC QN AUTO: 28.3 PG (ref 24–34)
MCHC RBC AUTO-ENTMCNC: 32.8 G/DL (ref 31–37)
MCV RBC AUTO: 86.1 FL (ref 78–100)
MONOCYTES # BLD: 0.8 K/UL (ref 0.05–1.2)
MONOCYTES NFR BLD: 10 % (ref 3–10)
NEUTS SEG # BLD: 5.5 K/UL (ref 1.8–8)
NEUTS SEG NFR BLD: 77 % (ref 40–73)
NRBC # BLD: 0 K/UL (ref 0–0.01)
NRBC BLD-RTO: 0 PER 100 WBC
PLATELET # BLD AUTO: 256 K/UL (ref 135–420)
PMV BLD AUTO: 10.6 FL (ref 9.2–11.8)
POTASSIUM SERPL-SCNC: 4.1 MMOL/L (ref 3.5–5.5)
PROT SERPL-MCNC: 7.4 G/DL (ref 6.4–8.2)
RBC # BLD AUTO: 3.89 M/UL (ref 4.2–5.3)
SODIUM SERPL-SCNC: 131 MMOL/L (ref 136–145)
TROPONIN I SERPL HS-MCNC: 56 NG/L (ref 0–54)
WBC # BLD AUTO: 7.2 K/UL (ref 4.6–13.2)

## 2024-10-14 PROCEDURE — 99285 EMERGENCY DEPT VISIT HI MDM: CPT

## 2024-10-14 PROCEDURE — 84484 ASSAY OF TROPONIN QUANT: CPT

## 2024-10-14 PROCEDURE — 71045 X-RAY EXAM CHEST 1 VIEW: CPT

## 2024-10-14 PROCEDURE — 87088 URINE BACTERIA CULTURE: CPT

## 2024-10-14 PROCEDURE — 85025 COMPLETE CBC W/AUTO DIFF WBC: CPT

## 2024-10-14 PROCEDURE — 80053 COMPREHEN METABOLIC PANEL: CPT

## 2024-10-14 PROCEDURE — 87086 URINE CULTURE/COLONY COUNT: CPT

## 2024-10-14 PROCEDURE — 84443 ASSAY THYROID STIM HORMONE: CPT

## 2024-10-14 PROCEDURE — 87186 SC STD MICRODIL/AGAR DIL: CPT

## 2024-10-14 RX ORDER — SODIUM CHLORIDE, SODIUM LACTATE, POTASSIUM CHLORIDE, AND CALCIUM CHLORIDE .6; .31; .03; .02 G/100ML; G/100ML; G/100ML; G/100ML
1000 INJECTION, SOLUTION INTRAVENOUS ONCE
Status: COMPLETED | OUTPATIENT
Start: 2024-10-14 | End: 2024-10-15

## 2024-10-14 ASSESSMENT — PAIN - FUNCTIONAL ASSESSMENT: PAIN_FUNCTIONAL_ASSESSMENT: NONE - DENIES PAIN

## 2024-10-15 PROBLEM — N17.9 AKI (ACUTE KIDNEY INJURY) (HCC): Status: ACTIVE | Noted: 2024-10-15

## 2024-10-15 LAB
APPEARANCE UR: CLEAR
BACTERIA URNS QL MICRO: ABNORMAL /HPF
BILIRUB UR QL: NEGATIVE
COLOR UR: YELLOW
EKG ATRIAL RATE: 56 BPM
EKG DIAGNOSIS: NORMAL
EKG P AXIS: 102 DEGREES
EKG P-R INTERVAL: 176 MS
EKG Q-T INTERVAL: 556 MS
EKG QRS DURATION: 170 MS
EKG QTC CALCULATION (BAZETT): 536 MS
EKG R AXIS: -79 DEGREES
EKG T AXIS: 90 DEGREES
EKG VENTRICULAR RATE: 56 BPM
EPITH CASTS URNS QL MICRO: ABNORMAL /LPF (ref 0–5)
GLUCOSE BLD STRIP.AUTO-MCNC: 126 MG/DL (ref 70–110)
GLUCOSE BLD STRIP.AUTO-MCNC: 162 MG/DL (ref 70–110)
GLUCOSE BLD STRIP.AUTO-MCNC: 164 MG/DL (ref 70–110)
GLUCOSE BLD STRIP.AUTO-MCNC: 175 MG/DL (ref 70–110)
GLUCOSE UR STRIP.AUTO-MCNC: 250 MG/DL
HGB UR QL STRIP: ABNORMAL
KETONES UR QL STRIP.AUTO: NEGATIVE MG/DL
LEUKOCYTE ESTERASE UR QL STRIP.AUTO: ABNORMAL
NITRITE UR QL STRIP.AUTO: NEGATIVE
PH UR STRIP: 7 (ref 5–8)
PROT UR STRIP-MCNC: NEGATIVE MG/DL
RBC #/AREA URNS HPF: ABNORMAL /HPF (ref 0–5)
SP GR UR REFRACTOMETRY: 1.01 (ref 1–1.03)
TROPONIN I SERPL HS-MCNC: 59 NG/L (ref 0–54)
TSH SERPL DL<=0.05 MIU/L-ACNC: 3.85 UIU/ML (ref 0.36–3.74)
UROBILINOGEN UR QL STRIP.AUTO: 1 EU/DL (ref 0.2–1)
WBC URNS QL MICRO: ABNORMAL /HPF (ref 0–5)

## 2024-10-15 PROCEDURE — 93005 ELECTROCARDIOGRAM TRACING: CPT | Performed by: HOSPITALIST

## 2024-10-15 PROCEDURE — 93010 ELECTROCARDIOGRAM REPORT: CPT | Performed by: INTERNAL MEDICINE

## 2024-10-15 PROCEDURE — 2580000003 HC RX 258: Performed by: HOSPITALIST

## 2024-10-15 PROCEDURE — 6360000002 HC RX W HCPCS: Performed by: HOSPITALIST

## 2024-10-15 PROCEDURE — 99223 1ST HOSP IP/OBS HIGH 75: CPT | Performed by: HOSPITALIST

## 2024-10-15 PROCEDURE — 81001 URINALYSIS AUTO W/SCOPE: CPT

## 2024-10-15 PROCEDURE — 82962 GLUCOSE BLOOD TEST: CPT

## 2024-10-15 PROCEDURE — 1100000003 HC PRIVATE W/ TELEMETRY

## 2024-10-15 PROCEDURE — 6370000000 HC RX 637 (ALT 250 FOR IP): Performed by: HOSPITALIST

## 2024-10-15 PROCEDURE — 94761 N-INVAS EAR/PLS OXIMETRY MLT: CPT

## 2024-10-15 RX ORDER — POLYETHYLENE GLYCOL 3350 17 G/17G
17 POWDER, FOR SOLUTION ORAL DAILY PRN
Status: DISCONTINUED | OUTPATIENT
Start: 2024-10-15 | End: 2024-10-18 | Stop reason: HOSPADM

## 2024-10-15 RX ORDER — GLUCAGON 1 MG
1 KIT INJECTION PRN
Status: DISCONTINUED | OUTPATIENT
Start: 2024-10-15 | End: 2024-10-18 | Stop reason: HOSPADM

## 2024-10-15 RX ORDER — SODIUM CHLORIDE 9 MG/ML
INJECTION, SOLUTION INTRAVENOUS CONTINUOUS
Status: DISCONTINUED | OUTPATIENT
Start: 2024-10-15 | End: 2024-10-17

## 2024-10-15 RX ORDER — SODIUM CHLORIDE 0.9 % (FLUSH) 0.9 %
5-40 SYRINGE (ML) INJECTION PRN
Status: DISCONTINUED | OUTPATIENT
Start: 2024-10-15 | End: 2024-10-18 | Stop reason: HOSPADM

## 2024-10-15 RX ORDER — ONDANSETRON 4 MG/1
4 TABLET, ORALLY DISINTEGRATING ORAL EVERY 8 HOURS PRN
Status: DISCONTINUED | OUTPATIENT
Start: 2024-10-15 | End: 2024-10-18 | Stop reason: HOSPADM

## 2024-10-15 RX ORDER — MEMANTINE HYDROCHLORIDE 5 MG/1
5 TABLET ORAL DAILY
COMMUNITY

## 2024-10-15 RX ORDER — MIDODRINE HYDROCHLORIDE 5 MG/1
10 TABLET ORAL
Status: DISCONTINUED | OUTPATIENT
Start: 2024-10-15 | End: 2024-10-17

## 2024-10-15 RX ORDER — POTASSIUM CHLORIDE 7.45 MG/ML
10 INJECTION INTRAVENOUS PRN
Status: DISCONTINUED | OUTPATIENT
Start: 2024-10-15 | End: 2024-10-18 | Stop reason: HOSPADM

## 2024-10-15 RX ORDER — CALCITRIOL 0.25 UG/1
0.25 CAPSULE, LIQUID FILLED ORAL EVERY OTHER DAY
COMMUNITY

## 2024-10-15 RX ORDER — SODIUM CHLORIDE 9 MG/ML
INJECTION, SOLUTION INTRAVENOUS PRN
Status: DISCONTINUED | OUTPATIENT
Start: 2024-10-15 | End: 2024-10-18 | Stop reason: HOSPADM

## 2024-10-15 RX ORDER — SODIUM CHLORIDE 0.9 % (FLUSH) 0.9 %
5-40 SYRINGE (ML) INJECTION EVERY 12 HOURS SCHEDULED
Status: DISCONTINUED | OUTPATIENT
Start: 2024-10-15 | End: 2024-10-18 | Stop reason: HOSPADM

## 2024-10-15 RX ORDER — ASPIRIN 81 MG/1
81 TABLET ORAL DAILY
Status: DISCONTINUED | OUTPATIENT
Start: 2024-10-15 | End: 2024-10-18

## 2024-10-15 RX ORDER — ATORVASTATIN CALCIUM 40 MG/1
40 TABLET, FILM COATED ORAL DAILY
Status: DISCONTINUED | OUTPATIENT
Start: 2024-10-15 | End: 2024-10-18 | Stop reason: HOSPADM

## 2024-10-15 RX ORDER — ACETAMINOPHEN 325 MG/1
650 TABLET ORAL EVERY 6 HOURS PRN
Status: DISCONTINUED | OUTPATIENT
Start: 2024-10-15 | End: 2024-10-18 | Stop reason: HOSPADM

## 2024-10-15 RX ORDER — AMIODARONE HYDROCHLORIDE 200 MG/1
200 TABLET ORAL DAILY
Status: DISCONTINUED | OUTPATIENT
Start: 2024-10-15 | End: 2024-10-18 | Stop reason: HOSPADM

## 2024-10-15 RX ORDER — DONEPEZIL HYDROCHLORIDE 5 MG/1
10 TABLET, FILM COATED ORAL DAILY
Status: DISCONTINUED | OUTPATIENT
Start: 2024-10-15 | End: 2024-10-18

## 2024-10-15 RX ORDER — DEXTROSE MONOHYDRATE 100 MG/ML
INJECTION, SOLUTION INTRAVENOUS CONTINUOUS PRN
Status: DISCONTINUED | OUTPATIENT
Start: 2024-10-15 | End: 2024-10-18 | Stop reason: HOSPADM

## 2024-10-15 RX ORDER — POTASSIUM CHLORIDE 1500 MG/1
40 TABLET, EXTENDED RELEASE ORAL PRN
Status: DISCONTINUED | OUTPATIENT
Start: 2024-10-15 | End: 2024-10-18 | Stop reason: HOSPADM

## 2024-10-15 RX ORDER — MAGNESIUM SULFATE IN WATER 40 MG/ML
2000 INJECTION, SOLUTION INTRAVENOUS PRN
Status: DISCONTINUED | OUTPATIENT
Start: 2024-10-15 | End: 2024-10-18 | Stop reason: HOSPADM

## 2024-10-15 RX ORDER — ONDANSETRON 2 MG/ML
4 INJECTION INTRAMUSCULAR; INTRAVENOUS EVERY 6 HOURS PRN
Status: DISCONTINUED | OUTPATIENT
Start: 2024-10-15 | End: 2024-10-18 | Stop reason: HOSPADM

## 2024-10-15 RX ORDER — INSULIN LISPRO 100 [IU]/ML
0-4 INJECTION, SOLUTION INTRAVENOUS; SUBCUTANEOUS
Status: DISCONTINUED | OUTPATIENT
Start: 2024-10-15 | End: 2024-10-18 | Stop reason: HOSPADM

## 2024-10-15 RX ORDER — ACETAMINOPHEN 650 MG/1
650 SUPPOSITORY RECTAL EVERY 6 HOURS PRN
Status: DISCONTINUED | OUTPATIENT
Start: 2024-10-15 | End: 2024-10-18 | Stop reason: HOSPADM

## 2024-10-15 RX ADMIN — APIXABAN 2.5 MG: 2.5 TABLET, FILM COATED ORAL at 21:25

## 2024-10-15 RX ADMIN — MIDODRINE HYDROCHLORIDE 10 MG: 10 TABLET ORAL at 08:54

## 2024-10-15 RX ADMIN — ATORVASTATIN CALCIUM 40 MG: 40 TABLET, FILM COATED ORAL at 08:54

## 2024-10-15 RX ADMIN — AMIODARONE HYDROCHLORIDE 200 MG: 200 TABLET ORAL at 08:54

## 2024-10-15 RX ADMIN — SODIUM CHLORIDE, PRESERVATIVE FREE 10 ML: 5 INJECTION INTRAVENOUS at 07:43

## 2024-10-15 RX ADMIN — DONEPEZIL HYDROCHLORIDE 10 MG: 5 TABLET, FILM COATED ORAL at 08:54

## 2024-10-15 RX ADMIN — ONDANSETRON 4 MG: 2 INJECTION INTRAMUSCULAR; INTRAVENOUS at 15:50

## 2024-10-15 RX ADMIN — SODIUM CHLORIDE: 9 INJECTION, SOLUTION INTRAVENOUS at 07:43

## 2024-10-15 RX ADMIN — SODIUM CHLORIDE, POTASSIUM CHLORIDE, SODIUM LACTATE AND CALCIUM CHLORIDE 1000 ML: 600; 310; 30; 20 INJECTION, SOLUTION INTRAVENOUS at 01:43

## 2024-10-15 RX ADMIN — SODIUM CHLORIDE: 9 INJECTION, SOLUTION INTRAVENOUS at 21:30

## 2024-10-15 RX ADMIN — MIDODRINE HYDROCHLORIDE 10 MG: 10 TABLET ORAL at 12:41

## 2024-10-15 RX ADMIN — MIDODRINE HYDROCHLORIDE 10 MG: 10 TABLET ORAL at 01:43

## 2024-10-15 RX ADMIN — MIDODRINE HYDROCHLORIDE 10 MG: 10 TABLET ORAL at 15:50

## 2024-10-15 RX ADMIN — ASPIRIN 81 MG: 81 TABLET, COATED ORAL at 08:54

## 2024-10-15 RX ADMIN — APIXABAN 2.5 MG: 2.5 TABLET, FILM COATED ORAL at 08:54

## 2024-10-15 RX ADMIN — SODIUM CHLORIDE, PRESERVATIVE FREE 10 ML: 5 INJECTION INTRAVENOUS at 08:58

## 2024-10-15 RX ADMIN — SODIUM CHLORIDE, PRESERVATIVE FREE 10 ML: 5 INJECTION INTRAVENOUS at 21:25

## 2024-10-15 ASSESSMENT — PAIN SCALES - GENERAL
PAINLEVEL_OUTOF10: 0

## 2024-10-15 NOTE — CARE COORDINATION
10/15/24 1246   Service Assessment   Patient Orientation Alert and Oriented;Person   Cognition Dementia / Early Alzheimer's   History Provided By Child/Family   Primary Caregiver Family   Accompanied By/Relationship Granddaughter   Support Systems Children;Family Members   Patient's Healthcare Decision Maker is: Legal Next of Kin   PCP Verified by CM Yes   Last Visit to PCP Within last 3 months   Prior Functional Level Assistance with the following:;Bathing;Dressing;Cooking;Housework;Shopping;Mobility   Current Functional Level Assistance with the following:;Bathing;Dressing;Toileting;Cooking;Housework;Shopping;Mobility   Can patient return to prior living arrangement Yes   Ability to make needs known: Poor   Family able to assist with home care needs: Yes   Would you like for me to discuss the discharge plan with any other family members/significant others, and if so, who? Yes  (children)   Financial Resources Medicare   Social/Functional History   Lives With Family;Daughter  (Per granddaughter. Patient alternates stays with her daughter's Candy)   Type of Home House   Home Layout Two level   Home Access Stairs to enter with rails   Bathroom Shower/Tub Walk-in shower   Bathroom Equipment Built-in shower seat   Bathroom Accessibility Accessible   Home Equipment Walker - Rolling   Receives Help From Family   ADL Assistance Needs assistance   Toileting Independent   Homemaking Assistance Needs assistance   Homemaking Responsibilities No   Ambulation Assistance Needs assistance  (with going upstairs. 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

## 2024-10-15 NOTE — CASE COMMUNICATION
Paged by nursing, question of potentially removing Worthy as it had somehow been mentioned or noted that patient could potentially have Worthy removed today.  Review of patient's most recent history it appears that patient was seen in outside facility at which point Worthy was placed and a void trial was failed.  Given such patient does have appropriate follow-up with urology already scheduled and as such would not necessarily benefit and in fact may be worse off if we attempt subsequent further voiding trials.  Given no significant indication at this time to pull the Worthy, will continue Worthy indefinitely, until otherwise indicated by urologic services.    Mario Dougherty MD   10/15/2024   [facility] [place] [facility]

## 2024-10-15 NOTE — PLAN OF CARE
Problem: Discharge Planning  Goal: Discharge to home or other facility with appropriate resources  Outcome: Progressing  Flowsheets (Taken 10/15/2024 0910)  Discharge to home or other facility with appropriate resources: Identify barriers to discharge with patient and caregiver     Problem: Safety - Adult  Goal: Free from fall injury  Recent Flowsheet Documentation  Taken 10/15/2024 0400 by Kali Luis RN  Free From Fall Injury: Instruct family/caregiver on patient safety     Problem: Genitourinary - Adult  Goal: Urinary catheter remains patent  Outcome: Progressing     Problem: Metabolic/Fluid and Electrolytes - Adult  Goal: Electrolytes maintained within normal limits  Outcome: Progressing

## 2024-10-15 NOTE — ED NOTES
Verbal shift change report given to Toby RN (oncoming nurse) by Gonzales RN (offgoing nurse). Report included the following information Nurse Handoff Report, ED Encounter Summary, and ED SBAR.

## 2024-10-15 NOTE — PLAN OF CARE
Problem: Chronic Conditions and Co-morbidities  Goal: Patient's chronic conditions and co-morbidity symptoms are monitored and maintained or improved  Outcome: Progressing     Problem: Discharge Planning  Goal: Discharge to home or other facility with appropriate resources  10/15/2024 1025 by Nicole Mccray RN  Outcome: Progressing  10/15/2024 0914 by Kali Luis RN  Outcome: Progressing  Flowsheets (Taken 10/15/2024 0910)  Discharge to home or other facility with appropriate resources: Identify barriers to discharge with patient and caregiver     Problem: Pain  Goal: Verbalizes/displays adequate comfort level or baseline comfort level  Outcome: Progressing     Problem: Safety - Adult  Goal: Free from fall injury  Outcome: Progressing  Flowsheets (Taken 10/15/2024 0400 by Kali Luis RN)  Free From Fall Injury: Instruct family/caregiver on patient safety     Problem: Genitourinary - Adult  Goal: Urinary catheter remains patent  10/15/2024 1025 by Nicole Mccray RN  Outcome: Progressing  10/15/2024 0914 by Kali Luis RN  Outcome: Progressing     Problem: Metabolic/Fluid and Electrolytes - Adult  Goal: Electrolytes maintained within normal limits  10/15/2024 1025 by Nicole Mccray RN  Outcome: Progressing  10/15/2024 0914 by Kali Luis RN  Outcome: Progressing     Problem: Neurosensory - Adult  Goal: Achieves stable or improved neurological status  Outcome: Progressing  Goal: Remains free of injury related to seizures activity  Outcome: Progressing     Problem: Cardiovascular - Adult  Goal: Maintains optimal cardiac output and hemodynamic stability  Outcome: Progressing     Problem: Musculoskeletal - Adult  Goal: Return mobility to safest level of function  Outcome: Progressing     Problem: Gastrointestinal - Adult  Goal: Maintains adequate nutritional intake  Outcome: Progressing     Problem: Infection - Adult  Goal: Absence of infection at discharge  Outcome: Progressing

## 2024-10-15 NOTE — CONSULTS
Room #: 202      GUY: [id number]      Situation: Wound Care Consult    Background:    PMH:   Active Ambulatory Problems     Diagnosis Date Noted    No Active Ambulatory Problems     Resolved Ambulatory Problems     Diagnosis Date Noted    No Resolved Ambulatory Problems     Past Medical History:   Diagnosis Date    Diabetes (HCC)     Hypertension         Suhas Score: 19/23   BMI: Body mass index is 24.56 kg/m².   Preventive measures in place: limited layers, Glide sheet, silicone to sacrum.    Assessment:   Patient found reclined.  Patient is Awake and alert and Pleasant and conversant. Follows commands, per family patient was admitted to [facility] 10/1 then discharged home, but has not been out of bed since 10/1.      Wound(s) Description:           Wound 10/15/24 Coccyx Posterior (Active)   Wound Image   10/15/24 1154   Wound Etiology Pressure Stage 2 10/15/24 1154   Dressing Status Reinforced dressing 10/15/24 1154   Dressing/Treatment Silicone pad 10/15/24 0800   Wound Length (cm) 5.8 cm 10/15/24 1154   Wound Width (cm) 7.1 cm 10/15/24 1154   Wound Surface Area (cm^2) 41.18 cm^2 10/15/24 1154   Wound Assessment Purple/maroon;Granulation tissue 10/15/24 1154   Drainage Amount Small (< 25%) 10/15/24 1154   Drainage Description Serosanguinous 10/15/24 1154   Odor None 10/15/24 1154   Carisa-wound Assessment Blanchable erythema 10/15/24 1154   Number of days: 0       Wound 10/15/24 Heel Right (Active)   Wound Image   10/15/24 1155   Wound Etiology Deep tissue/Injury 10/15/24 1155   Wound Length (cm) 2.2 cm 10/15/24 1155   Wound Width (cm) 4.1 cm 10/15/24 1155   Wound Surface Area (cm^2) 9.02 cm^2 10/15/24 1155   Wound Assessment Purple/maroon;Fluid filled blister 10/15/24 1155   Drainage Amount None (dry) 10/15/24 1155   Odor None 10/15/24 1155   Carisa-wound Assessment Intact 10/15/24 1155   Number of days: 0        Recommendations:    Wound care orders as follows: Silicone dressing to sacrum and bilateral heels.  Change

## 2024-10-15 NOTE — CONSULTS
for CHF and noted to also have renal failure with creatinine in the high 3 range patient also had uncontrolled hypertension and blood pressure medications were adjusted.  At home patient was running low blood pressures, was feeling dizzy tired, poor p.o. intake and therefore was brought into the emergency room.  Here she was noted to be hypotensive blood pressures of 90 systolic, orthostatic and creatinine was elevated at 4.65 BUN of 101.  Patient was given a liter of bolus fluids and started on normal saline..  Patient has been having a Worthy catheter placed during [facility] stay and had failed a voiding trial and had Worthy catheter at home.  She has been making good urine during the hospital stay so far.     Past Medical History:   Diagnosis Date    Diabetes (HCC)     Hypertension       Past Surgical History:   Procedure Laterality Date    COLONOSCOPY N/A 4/12/2018    COLONOSCOPY, DIAGNOSTIC performed by Leroy Yi MD at [facility] ENDOSCOPY    IR BIOPSY PERC SUPERF BONE  11/16/2020    IR BIOPSY PERC SUPERF BONE 11/16/2020 [facility] RAD ANGIO IR    IR BIOPSY PERC SUPERF BONE  11/16/2020       Social History     Socioeconomic History    Marital status: Single     Spouse name: Not on file    Number of children: Not on file    Years of education: Not on file    Highest education level: Not on file   Occupational History    Not on file   Tobacco Use    Smoking status: Never    Smokeless tobacco: Never   Substance and Sexual Activity    Alcohol use: No    Drug use: No    Sexual activity: Not on file   Other Topics Concern    Not on file   Social History Narrative    Not on file     Social Determinants of Health     Financial Resource Strain: Low Risk  (10/1/2024)    Received from [facility]    Overall Financial Resource Strain (CARDIA)     Difficulty of Paying Living Expenses: Not hard at all   Food Insecurity: No Food Insecurity (10/15/2024)    Hunger Vital Sign     Worried About Running Out of Food in    Medication Dose Route Frequency    0.9 % sodium chloride infusion   IntraVENous Continuous    amiodarone (CORDARONE) tablet 200 mg  200 mg Oral Daily    apixaban (ELIQUIS) tablet 2.5 mg  2.5 mg Oral BID    aspirin EC tablet 81 mg  81 mg Oral Daily    atorvastatin (LIPITOR) tablet 40 mg  40 mg Oral Daily    donepezil (ARICEPT) tablet 10 mg  10 mg Oral Daily    sodium chloride flush 0.9 % injection 5-40 mL  5-40 mL IntraVENous 2 times per day    sodium chloride flush 0.9 % injection 5-40 mL  5-40 mL IntraVENous PRN    0.9 % sodium chloride infusion   IntraVENous PRN    potassium chloride (KLOR-CON M) extended release tablet 40 mEq  40 mEq Oral PRN    Or    potassium bicarb-citric acid (EFFER-K) effervescent tablet 40 mEq  40 mEq Oral PRN    Or    potassium chloride 10 mEq/100 mL IVPB (Peripheral Line)  10 mEq IntraVENous PRN    magnesium sulfate 2000 mg in 50 mL IVPB premix  2,000 mg IntraVENous PRN    ondansetron (ZOFRAN-ODT) disintegrating tablet 4 mg  4 mg Oral Q8H PRN    Or    ondansetron (ZOFRAN) injection 4 mg  4 mg IntraVENous Q6H PRN    polyethylene glycol (GLYCOLAX) packet 17 g  17 g Oral Daily PRN    acetaminophen (TYLENOL) tablet 650 mg  650 mg Oral Q6H PRN    Or    acetaminophen (TYLENOL) suppository 650 mg  650 mg Rectal Q6H PRN    midodrine (PROAMATINE) tablet 10 mg  10 mg Oral TID WC    glucose chewable tablet 16 g  4 tablet Oral PRN    dextrose bolus 10% 125 mL  125 mL IntraVENous PRN    Or    dextrose bolus 10% 250 mL  250 mL IntraVENous PRN    Glucagon Emergency SOLR 1 mg  1 mg SubCUTAneous PRN    dextrose 10 % infusion   IntraVENous Continuous PRN    insulin lispro (HUMALOG,ADMELOG) injection vial 0-4 Units  0-4 Units SubCUTAneous 4x Daily AC & HS       Review of Systems:   No fever or chills. No sore throat. No cough or hemoptysis.. No orthopnea or paroxysmal nocturnal dyspnea. Good appetite. No nausea, vomiting, abdominal pain, melena or hematochezia. No constipation or diarrhea. No dysuria, no

## 2024-10-15 NOTE — PROGRESS NOTES
4 Eyes Skin Assessment     NAME:  Carleen Pineda  YOB: 1945  MEDICAL RECORD NUMBER:  [id number]    The patient is being assessed for  Shift Handoff    I agree that at least one RN has performed a thorough Head to Toe Skin Assessment on the patient. ALL assessment sites listed below have been assessed.      Areas assessed by both nurses:    Head, Face, Ears, Shoulders, Back, Chest, Arms, Elbows, Hands, Sacrum. Buttock, Coccyx, Ischium, and Legs. Feet and Heels        Does the Patient have a Wound? Yes wound(s) were present on assessment. LDA wound assessment was Initiated and completed by RN       Suhas Prevention initiated by RN: Yes  Wound Care Orders initiated by RN: Yes    Pressure Injury (Stage 3,4, Unstageable, DTI, NWPT, and Complex wounds) if present, place Wound referral order by RN under : No    New Ostomies, if present place, Ostomy referral order under : No     Nurse 1 eSignature: Electronically signed by Nicole Mccray RN on 10/15/24 at 5:31 PM EDT    **SHARE this note so that the co-signing nurse can place an eSignature**    Nurse 2 eSignature: {Esignature:[id number]}

## 2024-10-15 NOTE — H&P
HISTORY & PHYSICAL      Patient: Carleen Pineda MRN: [id number]  [facility]: [id number]    YOB: 1945  Age: 79 y.o.  Sex: female    DOA: 10/14/2024 LOS:  LOS: 0 days        DOA: 10/14/2024        Assessment/Plan     Principal Problem:    RIAN (acute kidney injury) ([facility])  Resolved Problems:    * No resolved hospital problems. *      Patient Active Problem List   Diagnosis    RIAN (acute kidney injury) ([facility])         Plan:  RIAN on CKD 4-gentle hydration.  Nephrology consulted, hold all diuretics  Hypotension-hold all antihypertensives, gentle hydration, continue to monitor blood pressure, added midodrine.  History of type 2 diabetes-insulin sliding scale.  Baseline dementia  DVT prophylaxis-heparin  Full code          History of Presenting Illness:    79-year-old female with a history of CKD 4, hypertension, type 2 diabetes, dementia presents to the emergency room secondary to hypotension.  Patient's daughter is at bedside and is the primary historian.  Per daughter patient was recently at [facility] for 10 days and was discharged on 1010.  At the hospital she was noted to have significant fluid retention all over, her legs were swollen and she was noted to have renal failure.  Patient was also noted to have an elevated blood pressure.  Patient was diuresed with improvement of creatinine as well as started on multiple blood pressure medications at the time of discharge.  Patient has been compliant with her blood pressure medications but now her blood pressure is running low.    ER evaluation-patient noted to be hypotensive and orthostatic with an elevated creatinine of 4.65, .  Patient given 1 L of fluid with some improvement of blood pressure.  Patient started on gentle hydration of 75 cc an hour.  All her diuretics including her blood pressure medications have been held.  Patient started on midodrine.  Patient is being admitted to the hospital for further evaluation.      Past Medical History:   Diagnosis    aspirin 81 MG EC tablet Take 1 tablet by mouth daily    Automatic Reconciliation, Ar   atorvastatin (LIPITOR) 40 MG tablet Take 1 tablet by mouth daily    Automatic Reconciliation, Ar   benazepril (LOTENSIN) 5 MG tablet Take 1 tablet by mouth daily    Automatic Reconciliation, Ar   bumetanide (BUMEX) 1 MG tablet Take 1 tablet by mouth daily    Automatic Reconciliation, Ar   glimepiride (AMARYL) 4 MG tablet Take 1 tablet by mouth    Automatic Reconciliation, Ar   glipiZIDE (GLUCOTROL XL) 2.5 MG extended release tablet Take by mouth daily    Automatic Reconciliation, Ar   hydrALAZINE (APRESOLINE) 10 MG tablet Take 1 tablet by mouth    Automatic Reconciliation, Ar   lisinopril (PRINIVIL;ZESTRIL) 5 MG tablet Take 1 tablet by mouth 2 times daily    Automatic Reconciliation, Ar   metFORMIN (GLUCOPHAGE) 500 MG tablet Take 1 tablet by mouth daily (with breakfast)    Automatic Reconciliation, Ar   metoprolol succinate (TOPROL XL) 50 MG extended release tablet Take 1 tablet by mouth daily    Automatic Reconciliation, Ar   spironolactone (ALDACTONE) 25 MG tablet Take 1 tablet by mouth daily    Automatic Reconciliation, Ar   warfarin (COUMADIN) 5 MG tablet Take 1 tablet by mouth    Automatic Reconciliation, Ar       No Known Allergies    Review of Systems:    Pertinent Positives noted in HPI.  Rest all other ROS were noted to be negative.                  Physical Exam:      Visit Vitals  BP 92/65   Pulse 62   Temp 98.1 °F (36.7 °C) (Oral)   Resp 12   SpO2 99%       Physical Exam:    Gen: In general, this is a well nourished female in no acute distress  HEENT: Sclerae nonicteric. Oral mucous membranes moist. Dentition normal  Neck: Supple with midline trachea.   CV: RRR without murmur or rub appreciated.   Resp:Respirations are unlabored without use of accessory muscles. Lung fields B/L without wheezes or rhonchi.   Abd: Soft, nontender, nondistended.  Extrem: Extremities are warm, without cyanosis or clubbing.  No pitting

## 2024-10-16 PROBLEM — I42.8 NONISCHEMIC CARDIOMYOPATHY (HCC): Status: ACTIVE | Noted: 2024-10-16

## 2024-10-16 PROBLEM — I34.0 MITRAL VALVE INSUFFICIENCY: Status: ACTIVE | Noted: 2024-10-16

## 2024-10-16 PROBLEM — N18.6 ESRD (END STAGE RENAL DISEASE) (HCC): Status: RESOLVED | Noted: 2024-10-04 | Resolved: 2024-10-16

## 2024-10-16 PROBLEM — R33.9 RETENTION OF URINE: Status: ACTIVE | Noted: 2024-03-29

## 2024-10-16 PROBLEM — R00.1 BRADYCARDIA: Status: RESOLVED | Noted: 2024-03-25 | Resolved: 2024-10-16

## 2024-10-16 PROBLEM — E87.5 HYPERKALEMIA: Status: RESOLVED | Noted: 2021-11-12 | Resolved: 2024-10-16

## 2024-10-16 PROBLEM — D50.9 IRON DEFICIENCY ANEMIA, UNSPECIFIED: Status: RESOLVED | Noted: 2021-10-12 | Resolved: 2024-10-16

## 2024-10-16 PROBLEM — F01.A0 MILD VASCULAR DEMENTIA WITHOUT BEHAVIORAL DISTURBANCE, PSYCHOTIC DISTURBANCE, MOOD DISTURBANCE, OR ANXIETY (HCC): Status: ACTIVE | Noted: 2024-10-04

## 2024-10-16 PROBLEM — I50.23 ACUTE ON CHRONIC SYSTOLIC HEART FAILURE (HCC): Status: RESOLVED | Noted: 2024-10-16 | Resolved: 2024-10-16

## 2024-10-16 PROBLEM — I48.0 PAROXYSMAL ATRIAL FIBRILLATION (HCC): Status: ACTIVE | Noted: 2021-03-21

## 2024-10-16 PROBLEM — N18.4 CKD (CHRONIC KIDNEY DISEASE) STAGE 4, GFR 15-29 ML/MIN (HCC): Status: ACTIVE | Noted: 2024-10-16

## 2024-10-16 PROBLEM — Z71.89 GOALS OF CARE, COUNSELING/DISCUSSION: Status: RESOLVED | Noted: 2024-10-04 | Resolved: 2024-10-16

## 2024-10-16 PROBLEM — I10 ESSENTIAL HYPERTENSION, BENIGN: Status: ACTIVE | Noted: 2024-10-16

## 2024-10-16 PROBLEM — I50.9 ACUTE ON CHRONIC CONGESTIVE HEART FAILURE (HCC): Status: RESOLVED | Noted: 2023-11-27 | Resolved: 2024-10-16

## 2024-10-16 PROBLEM — E11.9 NON-INSULIN DEPENDENT TYPE 2 DIABETES MELLITUS (HCC): Status: ACTIVE | Noted: 2024-10-16

## 2024-10-16 PROBLEM — J96.01 ACUTE HYPOXIC RESPIRATORY FAILURE: Status: RESOLVED | Noted: 2021-03-18 | Resolved: 2024-10-16

## 2024-10-16 PROBLEM — I25.10 CORONARY ARTERY DISEASE INVOLVING NATIVE HEART WITHOUT ANGINA PECTORIS: Status: ACTIVE | Noted: 2023-11-28

## 2024-10-16 PROBLEM — R06.02 SOB (SHORTNESS OF BREATH): Status: RESOLVED | Noted: 2024-10-01 | Resolved: 2024-10-16

## 2024-10-16 PROBLEM — Z51.5 PALLIATIVE CARE BY SPECIALIST: Status: RESOLVED | Noted: 2024-10-04 | Resolved: 2024-10-16

## 2024-10-16 LAB
ALBUMIN SERPL-MCNC: 2.2 G/DL (ref 3.4–5)
ALBUMIN/GLOB SERPL: 0.6 (ref 0.8–1.7)
ALP SERPL-CCNC: 129 U/L (ref 45–117)
ALT SERPL-CCNC: 35 U/L (ref 13–56)
ANION GAP SERPL CALC-SCNC: 7 MMOL/L (ref 3–18)
AST SERPL-CCNC: 24 U/L (ref 10–38)
BASOPHILS # BLD: 0 K/UL (ref 0–0.1)
BASOPHILS NFR BLD: 0 % (ref 0–2)
BILIRUB SERPL-MCNC: 1.1 MG/DL (ref 0.2–1)
BUN SERPL-MCNC: 89 MG/DL (ref 7–18)
BUN/CREAT SERPL: 22 (ref 12–20)
CALCIUM SERPL-MCNC: 8.4 MG/DL (ref 8.5–10.1)
CHLORIDE SERPL-SCNC: 99 MMOL/L (ref 100–111)
CO2 SERPL-SCNC: 31 MMOL/L (ref 21–32)
CREAT SERPL-MCNC: 3.97 MG/DL (ref 0.6–1.3)
DIFFERENTIAL METHOD BLD: ABNORMAL
EOSINOPHIL # BLD: 0.1 K/UL (ref 0–0.4)
EOSINOPHIL NFR BLD: 1 % (ref 0–5)
ERYTHROCYTE [DISTWIDTH] IN BLOOD BY AUTOMATED COUNT: 18 % (ref 11.6–14.5)
EST. AVERAGE GLUCOSE BLD GHB EST-MCNC: 151 MG/DL
GLOBULIN SER CALC-MCNC: 4 G/DL (ref 2–4)
GLUCOSE BLD STRIP.AUTO-MCNC: 144 MG/DL (ref 70–110)
GLUCOSE BLD STRIP.AUTO-MCNC: 156 MG/DL (ref 70–110)
GLUCOSE BLD STRIP.AUTO-MCNC: 188 MG/DL (ref 70–110)
GLUCOSE BLD STRIP.AUTO-MCNC: 215 MG/DL (ref 70–110)
GLUCOSE SERPL-MCNC: 180 MG/DL (ref 74–99)
HBA1C MFR BLD: 6.9 % (ref 4.2–5.6)
HCT VFR BLD AUTO: 29.1 % (ref 35–45)
HGB BLD-MCNC: 9.3 G/DL (ref 12–16)
IMM GRANULOCYTES # BLD AUTO: 0 K/UL (ref 0–0.04)
IMM GRANULOCYTES NFR BLD AUTO: 0 % (ref 0–0.5)
LYMPHOCYTES # BLD: 0.7 K/UL (ref 0.9–3.6)
LYMPHOCYTES NFR BLD: 12 % (ref 21–52)
MCH RBC QN AUTO: 28 PG (ref 24–34)
MCHC RBC AUTO-ENTMCNC: 32 G/DL (ref 31–37)
MCV RBC AUTO: 87.7 FL (ref 78–100)
MONOCYTES # BLD: 0.6 K/UL (ref 0.05–1.2)
MONOCYTES NFR BLD: 11 % (ref 3–10)
NEUTS SEG # BLD: 4.3 K/UL (ref 1.8–8)
NEUTS SEG NFR BLD: 75 % (ref 40–73)
NRBC # BLD: 0 K/UL (ref 0–0.01)
NRBC BLD-RTO: 0 PER 100 WBC
PHOSPHATE SERPL-MCNC: 4.5 MG/DL (ref 2.5–4.9)
PLATELET # BLD AUTO: 221 K/UL (ref 135–420)
PMV BLD AUTO: 10.5 FL (ref 9.2–11.8)
POTASSIUM SERPL-SCNC: 4.1 MMOL/L (ref 3.5–5.5)
PROT SERPL-MCNC: 6.2 G/DL (ref 6.4–8.2)
RBC # BLD AUTO: 3.32 M/UL (ref 4.2–5.3)
SODIUM SERPL-SCNC: 137 MMOL/L (ref 136–145)
WBC # BLD AUTO: 5.7 K/UL (ref 4.6–13.2)

## 2024-10-16 PROCEDURE — 83036 HEMOGLOBIN GLYCOSYLATED A1C: CPT

## 2024-10-16 PROCEDURE — 94761 N-INVAS EAR/PLS OXIMETRY MLT: CPT

## 2024-10-16 PROCEDURE — 36415 COLL VENOUS BLD VENIPUNCTURE: CPT

## 2024-10-16 PROCEDURE — 82962 GLUCOSE BLOOD TEST: CPT

## 2024-10-16 PROCEDURE — 1100000003 HC PRIVATE W/ TELEMETRY

## 2024-10-16 PROCEDURE — 84100 ASSAY OF PHOSPHORUS: CPT

## 2024-10-16 PROCEDURE — 97165 OT EVAL LOW COMPLEX 30 MIN: CPT

## 2024-10-16 PROCEDURE — 6360000002 HC RX W HCPCS: Performed by: HOSPITALIST

## 2024-10-16 PROCEDURE — 2580000003 HC RX 258: Performed by: HOSPITALIST

## 2024-10-16 PROCEDURE — 99232 SBSQ HOSP IP/OBS MODERATE 35: CPT | Performed by: STUDENT IN AN ORGANIZED HEALTH CARE EDUCATION/TRAINING PROGRAM

## 2024-10-16 PROCEDURE — 85025 COMPLETE CBC W/AUTO DIFF WBC: CPT

## 2024-10-16 PROCEDURE — 6370000000 HC RX 637 (ALT 250 FOR IP): Performed by: STUDENT IN AN ORGANIZED HEALTH CARE EDUCATION/TRAINING PROGRAM

## 2024-10-16 PROCEDURE — 6370000000 HC RX 637 (ALT 250 FOR IP): Performed by: HOSPITALIST

## 2024-10-16 PROCEDURE — 97535 SELF CARE MNGMENT TRAINING: CPT

## 2024-10-16 PROCEDURE — 80053 COMPREHEN METABOLIC PANEL: CPT

## 2024-10-16 RX ORDER — DAPAGLIFLOZIN 10 MG/1
10 TABLET, FILM COATED ORAL DAILY
Status: ON HOLD | COMMUNITY
Start: 2024-10-11 | End: 2024-10-18 | Stop reason: HOSPADM

## 2024-10-16 RX ORDER — SACUBITRIL AND VALSARTAN 24; 26 MG/1; MG/1
1 TABLET, FILM COATED ORAL 2 TIMES DAILY
Status: ON HOLD | COMMUNITY
Start: 2024-10-10 | End: 2024-10-18 | Stop reason: HOSPADM

## 2024-10-16 RX ORDER — MULTIVITAMIN WITH IRON
1 TABLET ORAL DAILY
Status: DISCONTINUED | OUTPATIENT
Start: 2024-10-16 | End: 2024-10-18 | Stop reason: HOSPADM

## 2024-10-16 RX ORDER — CARVEDILOL 3.12 MG/1
3.12 TABLET ORAL 2 TIMES DAILY WITH MEALS
COMMUNITY
Start: 2024-10-10

## 2024-10-16 RX ORDER — APIXABAN 2.5 MG/1
2.5 TABLET, FILM COATED ORAL 2 TIMES DAILY
COMMUNITY
Start: 2024-09-24

## 2024-10-16 RX ADMIN — ONDANSETRON 4 MG: 2 INJECTION INTRAMUSCULAR; INTRAVENOUS at 00:08

## 2024-10-16 RX ADMIN — SODIUM CHLORIDE: 9 INJECTION, SOLUTION INTRAVENOUS at 08:37

## 2024-10-16 RX ADMIN — INSULIN LISPRO 1 UNITS: 100 INJECTION, SOLUTION INTRAVENOUS; SUBCUTANEOUS at 16:39

## 2024-10-16 RX ADMIN — SODIUM CHLORIDE, PRESERVATIVE FREE 10 ML: 5 INJECTION INTRAVENOUS at 22:22

## 2024-10-16 RX ADMIN — APIXABAN 2.5 MG: 2.5 TABLET, FILM COATED ORAL at 08:31

## 2024-10-16 RX ADMIN — DONEPEZIL HYDROCHLORIDE 10 MG: 5 TABLET, FILM COATED ORAL at 08:31

## 2024-10-16 RX ADMIN — MIDODRINE HYDROCHLORIDE 10 MG: 10 TABLET ORAL at 08:30

## 2024-10-16 RX ADMIN — THERA TABS 1 TABLET: TAB at 16:42

## 2024-10-16 RX ADMIN — ATORVASTATIN CALCIUM 40 MG: 40 TABLET, FILM COATED ORAL at 08:31

## 2024-10-16 RX ADMIN — APIXABAN 2.5 MG: 2.5 TABLET, FILM COATED ORAL at 22:14

## 2024-10-16 RX ADMIN — ASPIRIN 81 MG: 81 TABLET, COATED ORAL at 08:31

## 2024-10-16 RX ADMIN — AMIODARONE HYDROCHLORIDE 200 MG: 200 TABLET ORAL at 08:32

## 2024-10-16 RX ADMIN — ONDANSETRON 4 MG: 2 INJECTION INTRAMUSCULAR; INTRAVENOUS at 15:03

## 2024-10-16 RX ADMIN — SODIUM CHLORIDE, PRESERVATIVE FREE 10 ML: 5 INJECTION INTRAVENOUS at 08:33

## 2024-10-16 ASSESSMENT — PAIN SCALES - GENERAL
PAINLEVEL_OUTOF10: 0

## 2024-10-16 NOTE — ED PROVIDER NOTES
EMERGENCY DEPARTMENT HISTORY AND PHYSICAL EXAM      Date: 10/14/2024  Patient Name: Carleen Pineda    History of Presenting Illness     Chief Complaint   Patient presents with    Hypotension       History (Context): Carleen Pineda is a 79 y.o. female  presents to the ED today with chief complaint of.  Per EMS patient was found to be hypotensive.  Per EMS patient recently admitted to an outside hospital and discharged after diuresis.  Patient states that she feels overall well upon presentation to the emergency department.  Without any further complaints at this time.  Denies any fever, chills, chest pain, dyspnea, abdominal pain, nausea, or vomiting associated with her chief complaint.        PCP: Opal Mcmullen MD    Current Facility-Administered Medications   Medication Dose Route Frequency Provider Last Rate Last Admin    0.9 % sodium chloride infusion   IntraVENous Continuous Zain Dupont MD 75 mL/hr at 10/15/24 2130 New Bag at 10/15/24 2130    amiodarone (CORDARONE) tablet 200 mg  200 mg Oral Daily Zain Dupont MD   200 mg at 10/15/24 0854    apixaban (ELIQUIS) tablet 2.5 mg  2.5 mg Oral BID Zain Dupont MD   2.5 mg at 10/15/24 2125    aspirin EC tablet 81 mg  81 mg Oral Daily Zain Dupont MD   81 mg at 10/15/24 0854    atorvastatin (LIPITOR) tablet 40 mg  40 mg Oral Daily Zain Dupont MD   40 mg at 10/15/24 0854    donepezil (ARICEPT) tablet 10 mg  10 mg Oral Daily Zain Dupont MD   10 mg at 10/15/24 0854    sodium chloride flush 0.9 % injection 5-40 mL  5-40 mL IntraVENous 2 times per day Zain Dupont MD   10 mL at 10/15/24 2125    sodium chloride flush 0.9 % injection 5-40 mL  5-40 mL IntraVENous PRN Zain Dupont MD   10 mL at 10/15/24 0743    0.9 % sodium chloride infusion   IntraVENous PRN Zain Dupont MD        potassium chloride (KLOR-CON M) extended release tablet 40 mEq  40 mEq Oral PRN Zain Dupont MD        Or    potassium bicarb-citric  Review:  Old medical records.  Previous electrocardiograms.  Nursing notes.  Ambulance run sheet.      Discussion of Mangement with other Physicians, Lists of hospitals in the [place] or Appropriate Source:  Hospitalist      Diagnosis and Disposition     CLINICAL IMPRESSION:  1. RIAN (acute kidney injury) (HCC)    2. Orthostatic hypotension         Medication List        ASK your doctor about these medications      amiodarone 200 MG tablet  Commonly known as: CORDARONE     amLODIPine-benazepril 5-40 MG per capsule  Commonly known as: LOTREL     apixaban 5 MG Tabs tablet  Commonly known as: ELIQUIS     aspirin 81 MG EC tablet     atorvastatin 40 MG tablet  Commonly known as: LIPITOR     benazepril 5 MG tablet  Commonly known as: LOTENSIN     bumetanide 1 MG tablet  Commonly known as: BUMEX     calcitRIOL 0.25 MCG capsule  Commonly known as: ROCALTROL     donepezil 10 MG tablet  Commonly known as: ARICEPT     glimepiride 4 MG tablet  Commonly known as: AMARYL     glipiZIDE 2.5 MG extended release tablet  Commonly known as: GLUCOTROL XL     hydrALAZINE 10 MG tablet  Commonly known as: APRESOLINE     ketoconazole 2 % cream  Commonly known as: NIZORAL     lisinopril 5 MG tablet  Commonly known as: PRINIVIL;ZESTRIL     memantine 5 MG tablet  Commonly known as: NAMENDA     metFORMIN 500 MG tablet  Commonly known as: GLUCOPHAGE     metOLazone 2.5 MG tablet  Commonly known as: ZAROXOLYN     metoprolol succinate 50 MG extended release tablet  Commonly known as: TOPROL XL     spironolactone 25 MG tablet  Commonly known as: ALDACTONE     tamsulosin 0.4 MG capsule  Commonly known as: FLOMAX  Take 1 capsule by mouth daily     triamcinolone 0.1 % cream  Commonly known as: KENALOG     warfarin 5 MG tablet  Commonly known as: COUMADIN              Disposition: Admit    Patient condition at time of disposition: Stable      Dragon Disclaimer     Please note that this dictation was completed with Zvooq, the computer voice recognition software.  Quite often

## 2024-10-16 NOTE — PLAN OF CARE
Problem: Chronic Conditions and Co-morbidities  Goal: Patient's chronic conditions and co-morbidity symptoms are monitored and maintained or improved  Outcome: Progressing     Problem: Discharge Planning  Goal: Discharge to home or other facility with appropriate resources  Outcome: Progressing     Problem: Pain  Goal: Verbalizes/displays adequate comfort level or baseline comfort level  Outcome: Progressing     Problem: Safety - Adult  Goal: Free from fall injury  Outcome: Progressing     Problem: Metabolic/Fluid and Electrolytes - Adult  Goal: Electrolytes maintained within normal limits  Outcome: Progressing     Problem: Infection - Adult  Goal: Absence of infection at discharge  Outcome: Progressing      No

## 2024-10-16 NOTE — PROGRESS NOTES
Report given to Sherry RECINOS. Report reviewed SBAR, MAR, VS and summary of care. Daughter at bs. Patient turned and positioned. Patient quietly resting, bed alarm on and  call light in reach.

## 2024-10-16 NOTE — PLAN OF CARE
Problem: Chronic Conditions and Co-morbidities  Goal: Patient's chronic conditions and co-morbidity symptoms are monitored and maintained or improved  10/16/2024 0930 by Jessica Benitez RN  Outcome: Progressing  10/16/2024 0520 by Sherry Segundo RN  Outcome: Progressing     Problem: Discharge Planning  Goal: Discharge to home or other facility with appropriate resources  10/16/2024 0930 by Jessica Benitez RN  Outcome: Progressing  10/16/2024 0520 by Sherry Segundo RN  Outcome: Progressing     Problem: Pain  Goal: Verbalizes/displays adequate comfort level or baseline comfort level  10/16/2024 0930 by Jessica Benitez RN  Outcome: Progressing  10/16/2024 0520 by Sherry Segundo RN  Outcome: Progressing     Problem: Safety - Adult  Goal: Free from fall injury  10/16/2024 0930 by Jessica Benitez RN  Outcome: Progressing  10/16/2024 0520 by Sherry Segundo RN  Outcome: Progressing     Problem: Genitourinary - Adult  Goal: Urinary catheter remains patent  Outcome: Progressing     Problem: Metabolic/Fluid and Electrolytes - Adult  Goal: Electrolytes maintained within normal limits  10/16/2024 0930 by Jessica Benitez RN  Outcome: Progressing  10/16/2024 0520 by Sherry Segundo RN  Outcome: Progressing     Problem: Neurosensory - Adult  Goal: Achieves stable or improved neurological status  Outcome: Progressing  Goal: Remains free of injury related to seizures activity  Outcome: Progressing     Problem: Cardiovascular - Adult  Goal: Maintains optimal cardiac output and hemodynamic stability  Outcome: Progressing     Problem: Musculoskeletal - Adult  Goal: Return mobility to safest level of function  Outcome: Progressing     Problem: Gastrointestinal - Adult  Goal: Maintains adequate nutritional intake  Outcome: Progressing     Problem: Infection - Adult  Goal: Absence of infection at discharge  10/16/2024 0930 by Jessica Benitez RN  Outcome:  Progressing  10/16/2024 0520 by Sherry Segundo, RN  Outcome: Progressing     Problem: Risk for Elopement  Goal: Patient will not exit the unit/facility without proper excort  Outcome: Progressing

## 2024-10-16 NOTE — PROGRESS NOTES
Cardiac Rehab Chart Review     Chart reviewed for possible cardiopulmonary rehab upon hospital discharge.     Blanca Merrill RN  10/16/24  10:13 AM

## 2024-10-16 NOTE — PROGRESS NOTES
4 Eyes Skin Assessment     NAME:  Carleen Pineda  YOB: 1945  MEDICAL RECORD NUMBER:  [id number]    The patient is being assessed for  Shift Handoff    I agree that at least one RN has performed a thorough Head to Toe Skin Assessment on the patient. ALL assessment sites listed below have been assessed.      Areas assessed by both nurses:    Head, Face, Ears, Shoulders, Back, Chest, Arms, Elbows, Hands, Sacrum. Buttock, Coccyx, Ischium, and Legs. Feet and Heels        Does the Patient have a Wound? Yes wound(s) were present on assessment. LDA wound assessment was Initiated and completed by RN       Suhas Prevention initiated by RN: Yes  Wound Care Orders initiated by RN: Yes    Pressure Injury (Stage 3,4, Unstageable, DTI, NWPT, and Complex wounds) if present, place Wound referral order by RN under : No    New Ostomies, if present place, Ostomy referral order under : No     Nurse 1 eSignature: Electronically signed by Jessica Jimenez RN on 10/16/24 at 7:23 PM EDT    **SHARE this note so that the co-signing nurse can place an eSignature**    Nurse 2 eSignature: {Esignature:[id number]}

## 2024-10-16 NOTE — PLAN OF CARE
Problem: Occupational Therapy - Adult  Goal: By Discharge: Performs self-care activities at highest level of function for planned discharge setting.  See evaluation for individualized goals.  Description: Occupational Therapy Goals:  Initiated 10/16/2024 to be met within 7-10 days.    1.  Patient will perform grooming while standing at sink with modified independence.   2.  Patient will perform upper body dressing with modified independence.  3.  Patient will perform lower body dressing  with modified independence.  4.  Patient will perform toilet transfers with modified independence  5.  Patient will perform all aspects of toileting with modified independence.  6.  Patient will participate in upper extremity therapeutic exercise/activities with supervision/set-up for 8-10 minutes to increase strength/endurance for ADLs.    7.  Patient will utilize energy conservation techniques during functional activities with verbal cues.    PLOF: Pt goes back and forth between 2 dtr's houses, has steps at each one, and walk in shower w/ seat at each. Pt is independent w/ mobility and ADLs.      Outcome: Progressing    OCCUPATIONAL THERAPY EVALUATION    Patient: Carleen Pineda (79 y.o. female)  Date: 10/16/2024  Primary Diagnosis: Orthostatic hypotension [I95.1]  RIAN (acute kidney injury) ([facility]) [N17.9]       Precautions: Fall Risk, General Precautions,    ASSESSMENT :  Pt semi supine in bed and agreeable to OT evaluation. Pt very pleasant. Dtr present in room. Bed mobility mod I. Good static sitting balance. BP in sitting 118/64. Pt denies pain or dizziness. BUE strength grossly 4/5. Sit>stand SBA. Pt ambulates around bed w/ RW, CGA for safety. Pt stands at sink wash washes hands, CGA. Pt ambulates back to bed w/ RW, CGA. Pt returns semi supine mod I. Pt tolerates session well. Pt would benefit from continued OT treatment to maximize pt's independence and safety w/ ADLs and functional mobility.

## 2024-10-16 NOTE — PROGRESS NOTES
Comprehensive Nutrition Assessment    Type and Reason for Visit:   PI    Nutrition Recommendations/Plan:   Modify PO diet add NCS  Order Ernie (each provides 80 kcals, 2.5 g collagen protein, 300 mg vitamin C, 9.5 mg zinc) BID  Glucerna shakes 8 oz daily  Recommend/order virt-caps supplementation daily  Continue to monitor tolerance of PO, compliance of oral supplements, weight, labs, and plan of care during admission.     Malnutrition Assessment:  Malnutrition Status:    Moderate malnutrition    Context:      Chronic Illness  Findings of the 6 clinical characteristics of malnutrition:  Energy Intake:   <75% x > 1 month  Weight Loss:   11% x < 3 months            Nutrition History and Allergies:      Past Medical History:   Diagnosis Date    Diabetes (HCC)     Hypertension       Weight Hx: loss Wt change: 17 lb (11%) x 2 months - significant.   Wt Readings from Last 10 Encounters:   10/15/24 60.9 kg (134 lb 4.2 oz)   24 68.5 kg (151 lb)   24 64.4 kg (142 lb)   04/10/24 63.5 kg (140 lb)   24 64 kg (141 lb)     NFPE: unable to perform NFPE. Food Allergies: NKFA    Nutrition Assessment:       Ms Pineda is an 79 YOF Admitted for  the following dx:  RIAN on CKD 4-gentle hydration.   Hypotension    Pt seen for - Positive Nutrition Screen: LIZHai Durant's oral intake is fair- good 50-75%  Weight loss hx 110% in < 3 months, healthy BMI- 24.5  Progressive dementia with increase assistance with ADL's feeding, mobility.  Stage II coccyx noted,      alb 2.2 - moderate deplete and progressive kidney disease Stage 5 affecting alb.    Fair- Good BS mgmt with insulin, <190 mg/dl    Decline in nutritional status with disease progression noted.        Nutrition Related Findings:              Last BM (including prior to admit): 10/14/24. Pertinent Meds: Lipitor, Humalog, Aricept Pertinent Labs: POC Glucose 144-188 mg/dl x 24 hrs, GFR-12, H/H- 9.3, 29.2, Alb 2.2  Edema:     Edema: None           Temp: 97.8 °F

## 2024-10-16 NOTE — PROGRESS NOTES
In Patient Progress note      Admit Date: 10/14/2024    Impression:     #1 acute kidney injury on chronic kidney disease stage IV, baseline creatinine in the 2 range,, this is likely secondary to diuresis in the setting of cardiorenal syndrome.  Volume status does look to be on the dry side likely from aggressive diuresis.  Diuretics have been held and gentle hydration.  Patient is making more urine.  #2 hypotension secondary to diuresis, low output CHF.  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  #4 mild hyponatremia secondary to CHF  #5 heart failure with reduced EF, severe pulmonary hypertension, RV dysfunction  #6 anemia of CKD  #7 nausea, Vx, GI symptoms     Recommendations:     #1 strict intake output charting,  Worthy catheter in place (has been in place since [facility] hospital stay, failed voiding trial)  #2 hold diuretics and antihypertensive agents  #3 continue midodrine  #4 gentle hydration with normal saline , cut down IVF to 30 cc/hrs   #5 monitor electrolytes renal functions daily  #6 renally dose medications for EGFR of less than 15  #7 consider GI symptoms eval      Discussed plan with patient and her daughter     Please call with questions     Stanley Alvarez MD FASN  Cell [phone]  Pager: [phone]  Fax   [phone]     Subjective:     - intake improved  - respiratory - stable, no SOB  - hemodynamics - stable, no pressrs  - UOP-ok  - Nutrition -ok    Objective:     BP (!) 119/48   Pulse 51   Temp 97.8 °F (36.6 °C) (Oral)   Resp 15   Ht 1.575 m (5' 2\")   Wt 60.9 kg (134 lb 4.2 oz)   SpO2 97%   BMI 24.56 kg/m²       Intake/Output Summary (Last 24 hours) at 10/16/2024 1217  Last data filed at 10/16/2024 0837  Gross per 24 hour   Intake 738 ml   Output 1150 ml   Net -412 ml       Physical Exam:     Lying flat in no distress  HENT mmm  RS AEBE   CVS s1s2 wnl no JVD  Ext edema +  Skin no rashes  Neuro oriented X 3     Data

## 2024-10-16 NOTE — PROGRESS NOTES
[facility]  Progress Note    Patient: Carleen Pineda Age: 79 y.o. : 1945 MR#: [id number] SSN: [id number]  Date/Time: 10/16/2024    Subjective:   Subjective   No acute events overnight, no new concerns or complaints, vitals stable.    Review of Systems   Constitutional: Negative for fever.      Assessment/Plan:   RIAN on CKD 4  Hypotension  Type 2 diabetes  Dementia  Systolic CHF  CAD  Hypertension  Paroxysmal A-fib    Plan  Nephrology on board, appreciate their assistance.  Creatinine is improving however not at baseline.  Continue gentle hydration per nephrology recommendation, continue holding diuretics antihypertensives.    Disposition: Expected location: Home     Expected discharge date: 10/18/2024      Case discussed with:  [x]Patient  []Family  [x]Nursing  [x]Case Management  DVT Prophylaxis:  []Lovenox  []Hep SQ  [x]SCDs  []Coumadin   []On Heparin gtt   [x] DOAC    Objective:   Objective:  General Appearance:  Comfortable, well-appearing, in no acute distress and not in pain.    Vital signs: (most recent): Blood pressure 124/67, pulse 57, temperature 98.1 °F (36.7 °C), temperature source Oral, resp. rate 15, height 1.575 m (5' 2\"), weight 60.9 kg (134 lb 4.2 oz), SpO2 97%.  Vital signs are normal.  No fever.    HEENT: Normal HEENT exam.    Lungs:  Normal effort and normal respiratory rate.  Breath sounds clear to auscultation.    Heart: Normal rate.  Regular rhythm.  S1 normal and S2 normal.  No murmur, gallop or friction rub.   Chest: Symmetric chest wall expansion.   Abdomen: Abdomen is soft and non-distended.  Bowel sounds are normal.   There is no abdominal tenderness.     Extremities: Normal range of motion.    Pulses: Distal pulses are intact.    Neurological: Patient is alert.  Normal strength.    Pupils:  Pupils are equal, round, and reactive to light.    Skin:  Warm and dry.         Labs:    Recent Results (from the past 24 hour(s))   Urinalysis

## 2024-10-17 LAB
ALBUMIN SERPL-MCNC: 2.4 G/DL (ref 3.4–5)
ALBUMIN/GLOB SERPL: 0.6 (ref 0.8–1.7)
ALP SERPL-CCNC: 123 U/L (ref 45–117)
ALT SERPL-CCNC: 31 U/L (ref 13–56)
ANION GAP SERPL CALC-SCNC: 4 MMOL/L (ref 3–18)
AST SERPL-CCNC: 24 U/L (ref 10–38)
BACTERIA SPEC CULT: ABNORMAL
BASOPHILS # BLD: 0 K/UL (ref 0–0.1)
BASOPHILS NFR BLD: 0 % (ref 0–2)
BILIRUB SERPL-MCNC: 0.8 MG/DL (ref 0.2–1)
BUN SERPL-MCNC: 83 MG/DL (ref 7–18)
BUN/CREAT SERPL: 24 (ref 12–20)
CALCIUM SERPL-MCNC: 8.6 MG/DL (ref 8.5–10.1)
CC UR VC: ABNORMAL
CHLORIDE SERPL-SCNC: 102 MMOL/L (ref 100–111)
CO2 SERPL-SCNC: 30 MMOL/L (ref 21–32)
CREAT SERPL-MCNC: 3.43 MG/DL (ref 0.6–1.3)
DIFFERENTIAL METHOD BLD: ABNORMAL
EOSINOPHIL # BLD: 0.1 K/UL (ref 0–0.4)
EOSINOPHIL NFR BLD: 2 % (ref 0–5)
ERYTHROCYTE [DISTWIDTH] IN BLOOD BY AUTOMATED COUNT: 18 % (ref 11.6–14.5)
GLOBULIN SER CALC-MCNC: 3.8 G/DL (ref 2–4)
GLUCOSE BLD STRIP.AUTO-MCNC: 129 MG/DL (ref 70–110)
GLUCOSE BLD STRIP.AUTO-MCNC: 159 MG/DL (ref 70–110)
GLUCOSE BLD STRIP.AUTO-MCNC: 164 MG/DL (ref 70–110)
GLUCOSE BLD STRIP.AUTO-MCNC: 198 MG/DL (ref 70–110)
GLUCOSE SERPL-MCNC: 140 MG/DL (ref 74–99)
HCT VFR BLD AUTO: 30 % (ref 35–45)
HGB BLD-MCNC: 9.5 G/DL (ref 12–16)
IMM GRANULOCYTES # BLD AUTO: 0 K/UL (ref 0–0.04)
IMM GRANULOCYTES NFR BLD AUTO: 0 % (ref 0–0.5)
LYMPHOCYTES # BLD: 0.8 K/UL (ref 0.9–3.6)
LYMPHOCYTES NFR BLD: 14 % (ref 21–52)
MCH RBC QN AUTO: 27.9 PG (ref 24–34)
MCHC RBC AUTO-ENTMCNC: 31.7 G/DL (ref 31–37)
MCV RBC AUTO: 88.2 FL (ref 78–100)
MONOCYTES # BLD: 0.6 K/UL (ref 0.05–1.2)
MONOCYTES NFR BLD: 10 % (ref 3–10)
NEUTS SEG # BLD: 4.2 K/UL (ref 1.8–8)
NEUTS SEG NFR BLD: 73 % (ref 40–73)
NRBC # BLD: 0 K/UL (ref 0–0.01)
NRBC BLD-RTO: 0 PER 100 WBC
PHOSPHATE SERPL-MCNC: 3.8 MG/DL (ref 2.5–4.9)
PLATELET # BLD AUTO: 234 K/UL (ref 135–420)
PMV BLD AUTO: 10.3 FL (ref 9.2–11.8)
POTASSIUM SERPL-SCNC: 3.9 MMOL/L (ref 3.5–5.5)
PROT SERPL-MCNC: 6.2 G/DL (ref 6.4–8.2)
RBC # BLD AUTO: 3.4 M/UL (ref 4.2–5.3)
SERVICE CMNT-IMP: ABNORMAL
SODIUM SERPL-SCNC: 136 MMOL/L (ref 136–145)
WBC # BLD AUTO: 5.8 K/UL (ref 4.6–13.2)

## 2024-10-17 PROCEDURE — 36415 COLL VENOUS BLD VENIPUNCTURE: CPT

## 2024-10-17 PROCEDURE — 80053 COMPREHEN METABOLIC PANEL: CPT

## 2024-10-17 PROCEDURE — 85025 COMPLETE CBC W/AUTO DIFF WBC: CPT

## 2024-10-17 PROCEDURE — 6370000000 HC RX 637 (ALT 250 FOR IP): Performed by: STUDENT IN AN ORGANIZED HEALTH CARE EDUCATION/TRAINING PROGRAM

## 2024-10-17 PROCEDURE — 2580000003 HC RX 258: Performed by: INTERNAL MEDICINE

## 2024-10-17 PROCEDURE — 1100000003 HC PRIVATE W/ TELEMETRY

## 2024-10-17 PROCEDURE — 6360000002 HC RX W HCPCS: Performed by: HOSPITALIST

## 2024-10-17 PROCEDURE — 2580000003 HC RX 258: Performed by: HOSPITALIST

## 2024-10-17 PROCEDURE — 97535 SELF CARE MNGMENT TRAINING: CPT

## 2024-10-17 PROCEDURE — 82962 GLUCOSE BLOOD TEST: CPT

## 2024-10-17 PROCEDURE — 97162 PT EVAL MOD COMPLEX 30 MIN: CPT

## 2024-10-17 PROCEDURE — 94761 N-INVAS EAR/PLS OXIMETRY MLT: CPT

## 2024-10-17 PROCEDURE — 84100 ASSAY OF PHOSPHORUS: CPT

## 2024-10-17 PROCEDURE — 99232 SBSQ HOSP IP/OBS MODERATE 35: CPT | Performed by: STUDENT IN AN ORGANIZED HEALTH CARE EDUCATION/TRAINING PROGRAM

## 2024-10-17 PROCEDURE — 6370000000 HC RX 637 (ALT 250 FOR IP): Performed by: HOSPITALIST

## 2024-10-17 RX ORDER — GRANULES FOR ORAL 3 G/1
3 POWDER ORAL ONCE
Status: COMPLETED | OUTPATIENT
Start: 2024-10-17 | End: 2024-10-17

## 2024-10-17 RX ORDER — MIDODRINE HYDROCHLORIDE 5 MG/1
5 TABLET ORAL
Status: DISCONTINUED | OUTPATIENT
Start: 2024-10-17 | End: 2024-10-18 | Stop reason: HOSPADM

## 2024-10-17 RX ADMIN — THERA TABS 1 TABLET: TAB at 07:55

## 2024-10-17 RX ADMIN — GRANULES FOR ORAL SOLUTION 1 PACKET: 3 POWDER ORAL at 15:56

## 2024-10-17 RX ADMIN — AMIODARONE HYDROCHLORIDE 200 MG: 200 TABLET ORAL at 07:55

## 2024-10-17 RX ADMIN — ASPIRIN 81 MG: 81 TABLET, COATED ORAL at 07:55

## 2024-10-17 RX ADMIN — ATORVASTATIN CALCIUM 40 MG: 40 TABLET, FILM COATED ORAL at 07:55

## 2024-10-17 RX ADMIN — INSULIN LISPRO 1 UNITS: 100 INJECTION, SOLUTION INTRAVENOUS; SUBCUTANEOUS at 16:00

## 2024-10-17 RX ADMIN — APIXABAN 2.5 MG: 2.5 TABLET, FILM COATED ORAL at 07:55

## 2024-10-17 RX ADMIN — DONEPEZIL HYDROCHLORIDE 10 MG: 5 TABLET, FILM COATED ORAL at 07:55

## 2024-10-17 RX ADMIN — SODIUM CHLORIDE, PRESERVATIVE FREE 10 ML: 5 INJECTION INTRAVENOUS at 21:59

## 2024-10-17 RX ADMIN — ONDANSETRON 4 MG: 2 INJECTION INTRAMUSCULAR; INTRAVENOUS at 11:52

## 2024-10-17 RX ADMIN — SODIUM CHLORIDE: 9 INJECTION, SOLUTION INTRAVENOUS at 08:03

## 2024-10-17 RX ADMIN — APIXABAN 2.5 MG: 2.5 TABLET, FILM COATED ORAL at 21:55

## 2024-10-17 RX ADMIN — SODIUM CHLORIDE, PRESERVATIVE FREE 10 ML: 5 INJECTION INTRAVENOUS at 08:17

## 2024-10-17 ASSESSMENT — PAIN SCALES - GENERAL
PAINLEVEL_OUTOF10: 0

## 2024-10-17 NOTE — PLAN OF CARE
Problem: Physical Therapy - Adult  Goal: By Discharge: Performs mobility at highest level of function for planned discharge setting.  See evaluation for individualized goals.  Description: Physical Therapy Goals  Initiated 10/17/2024 and to be accomplished within 7 day(s)  1.  Patient will move from supine to sit and sit to supine in bed with modified independence in preparation for seated tasks.    2.  Patient will transfer from bed to chair and chair to bed with modified independence using the least restrictive device in preparation for out of bed.  3.  Patient will perform sit to stand with modified independence in preparation for out of bed tasks.  4.  Patient will ambulate with modified independence for 150 feet with the least restrictive device in preparation for home setting.     PLOF: Per chart, lives with daughters in two story home. Independent. Per patient, unsure who lives with (may be alone), one story home. May use ww or cane as needed.   Outcome: Progressing   PHYSICAL THERAPY EVALUATION    Patient: Carleen Pineda (79 y.o. female)  Date: 10/17/2024  Primary Diagnosis: Orthostatic hypotension [I95.1]  RIAN (acute kidney injury) ([facility]) [N17.9]  Precautions: Fall Risk, General Precautions  ASSESSMENT :  Oriented to self and place. Disoriented to time and situation. Poor safety awareness. Poor historian of home set-up and prior level of function. Supervision for supine to sit. Supervision for sit to stand. Amb 25ft with supervision and reaching for furniture. Poor awareness of lines. Returned to seated in bed. Bed alarm on. Educated on need for RN assistance with mobility; verbalized understanding. Call bell in reach.     DEFICITS/IMPAIRMENTS:   Body Structures, Functions, Activity Limitations Requiring Skilled Therapeutic Intervention: Decreased functional mobility ;Decreased safe awareness;Decreased cognition;Decreased endurance;Decreased balance;Decreased strength    Patient will benefit from skilled  intervention to address the above impairments.  Patient's rehabilitation potential: Therapy Prognosis: Good.  Factors which may influence rehabilitation potential include:  []         None noted  []         Mental ability/status  [x]         Medical condition  []         Home/family situation and support systems  []         Safety awareness  []         Pain tolerance/management  []         Other:      PLAN :  Recommendations and Planned Interventions:   [x]           Bed Mobility Training             [x]    Neuromuscular Re-Education  [x]           Transfer Training                   []    Orthotic/Prosthetic Training  [x]           Gait Training                          []    Modalities  [x]           Therapeutic Exercises           []    Edema Management/Control  [x]           Therapeutic Activities            [x]    Family Training/Education  [x]           Patient Education  []           Other (comment):    Frequency/Duration: Patient will be followed by physical therapy 1-2 times per day/3-5 days per week to address goals.    Further Equipment Recommendations for Discharge: rolling walker    AM-PAC Inpatient Mobility Raw Score : 18     This AMPA score should be considered in conjunction with interdisciplinary team recommendations to determine the most appropriate discharge setting. Patient's social support, diagnosis, medical stability, and prior level of function should also be taken into consideration.    Current research shows that an AM-PAC score of 18 (14 without stairs) or greater is associated with a discharge to the patient's home setting.     SUBJECTIVE:   Patient stated “I don't know who I live with.”    OBJECTIVE DATA SUMMARY:     Past Medical History:   Diagnosis Date    Acute hypoxic respiratory failure 03/18/2021    Acute on chronic congestive heart failure (HCC) 11/27/2023    Bradycardia 03/25/2024    Diabetes (HCC)     ESRD (end stage renal disease) ([facility]) 10/04/2024    Goals of care,

## 2024-10-17 NOTE — PROGRESS NOTES
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  #4 mild hyponatremia secondary to CHF  #5 heart failure with reduced EF, severe pulmonary hypertension, RV dysfunction  #6 anemia of CKD  #7 nausea, Vx, GI symptoms     Recommendations:     #1 strict intake output charting,  Worthy catheter in place (has been in place since [facility] hospital stay, failed voiding trial)  #2 hold diuretics and antihypertensive agents  #3 decrease midodrine to 5 mg TID   #4 d/c IVF and encourage po intake   #5 monitor electrolytes renal functions daily  #6 renally dose medications for EGFR of less than 15  #7 consider GI symptoms eval      Discussed plan with patient      Please call with questions     Stanley Alvarez MD FASN  Cell [phone]  Pager: [phone]  Fax   [phone]     Subjective:     - intake improved  - respiratory - stable, no SOB  - hemodynamics - stable, no pressrs  - UOP-ok  - Nutrition -ok    Objective:     /71   Pulse 64   Temp 97.3 °F (36.3 °C) (Oral)   Resp 18   Ht 1.575 m (5' 2\")   Wt 60.9 kg (134 lb 4.2 oz)   SpO2 98%   BMI 24.56 kg/m²       Intake/Output Summary (Last 24 hours) at 10/17/2024 1237  Last data filed at 10/17/2024 0756  Gross per 24 hour   Intake 462 ml   Output 1500 ml   Net -1038 ml       Physical Exam:     Lying flat in no distress  HENT mmm  RS AEBE   CVS s1s2 wnl no JVD  Ext edema none   Skin no rashes  Neuro oriented X 3     Data Review:    Recent Labs     10/17/24  0324   WBC 5.8   RBC 3.40*   HCT 30.0*   MCV 88.2   MCH 27.9   MCHC 31.7   RDW 18.0*   MPV 10.3     Recent Labs     10/14/24  2010 10/16/24  0349 10/17/24  0324   * 89* 83*   K 4.1 4.1 3.9   * 137 136   CL 88* 99* 102   CO2 32 31 30   PHOS  --  4.5 3.8       Stanley ALEMAN

## 2024-10-17 NOTE — PLAN OF CARE
Problem: Chronic Conditions and Co-morbidities  Goal: Patient's chronic conditions and co-morbidity symptoms are monitored and maintained or improved  10/16/2024 0930 by Jessica Benitez RN  Outcome: Progressing     Problem: Discharge Planning  Goal: Discharge to home or other facility with appropriate resources  10/16/2024 2101 by Kali Luis RN  Outcome: Progressing  10/16/2024 0930 by Jessica Benitez RN  Outcome: Progressing     Problem: Pain  Goal: Verbalizes/displays adequate comfort level or baseline comfort level  10/16/2024 2101 by Kali Luis RN  Outcome: Progressing  10/16/2024 0930 by Jessica Benitez RN  Outcome: Progressing     Problem: Safety - Adult  Goal: Free from fall injury  10/16/2024 2101 by Kali Luis RN  Outcome: Progressing  10/16/2024 0930 by Jessica Benitez RN  Outcome: Progressing

## 2024-10-17 NOTE — PROGRESS NOTES
[facility]  Progress Note    Patient: Carleen Pineda Age: 79 y.o. : 1945 MR#: [id number] SSN: [id number]  Date/Time: 10/17/2024    Subjective:   Subjective   No acute events overnight, no new concerns or complaints, vitals stable.    Review of Systems   Constitutional: Negative for fever.      Assessment/Plan:   RIAN on CKD 4  Hypotension  Type 2 diabetes  Dementia  Systolic CHF  CAD  Hypertension  Paroxysmal A-fib  UTI    Plan  Nephrology on board, appreciate their assistance.  Creatinine is improving however not at baseline.    Continue to hold diuretics antihypertensives however IV fluids discontinued.  Enterococcus faecalis growing on urine culture, will add fosfomycin single dose 3 g p.o.    Disposition: Expected location: Home     Expected discharge date: 10/18/2024      Case discussed with:  [x]Patient  []Family  [x]Nursing  [x]Case Management  DVT Prophylaxis:  []Lovenox  []Hep SQ  [x]SCDs  []Coumadin   []On Heparin gtt   [x] DOAC    Objective:   Objective:  General Appearance:  Comfortable, well-appearing, in no acute distress and not in pain.    Vital signs: (most recent): Blood pressure 130/71, pulse 64, temperature 97.3 °F (36.3 °C), temperature source Oral, resp. rate 18, height 1.575 m (5' 2\"), weight 60.9 kg (134 lb 4.2 oz), SpO2 98%.  Vital signs are normal.  No fever.    HEENT: Normal HEENT exam.    Lungs:  Normal effort and normal respiratory rate.  Breath sounds clear to auscultation.    Heart: Normal rate.  Regular rhythm.  S1 normal and S2 normal.  No murmur, gallop or friction rub.   Chest: Symmetric chest wall expansion.   Abdomen: Abdomen is soft and non-distended.  Bowel sounds are normal.   There is no abdominal tenderness.     Extremities: Normal range of motion.    Pulses: Distal pulses are intact.    Neurological: Patient is alert.  Normal strength.    Pupils:  Pupils are equal, round, and reactive to light.    Skin:  Warm and dry.

## 2024-10-17 NOTE — PROGRESS NOTES
Report received from Dirk RECINOS. Report reviewed from SBAR, MAR VS and summary of care. Daughter at the bedside. Patient quietly resting with eyes closed. Call bell in reach.

## 2024-10-17 NOTE — PLAN OF CARE
Problem: Occupational Therapy - Adult  Goal: By Discharge: Performs self-care activities at highest level of function for planned discharge setting.  See evaluation for individualized goals.  Description: Occupational Therapy Goals:  Initiated 10/16/2024 to be met within 7-10 days.    1.  Patient will perform grooming while standing at sink with modified independence.   2.  Patient will perform upper body dressing with modified independence.  3.  Patient will perform lower body dressing  with modified independence.  4.  Patient will perform toilet transfers with modified independence  5.  Patient will perform all aspects of toileting with modified independence.  6.  Patient will participate in upper extremity therapeutic exercise/activities with supervision/set-up for 8-10 minutes to increase strength/endurance for ADLs.    7.  Patient will utilize energy conservation techniques during functional activities with verbal cues.    PLOF: Pt goes back and forth between 2 dtr's houses, has steps at each one, and walk in shower w/ seat at each. Pt is independent w/ mobility and ADLs.      Outcome: Progressing   OCCUPATIONAL THERAPY TREATMENT    Patient: Carleen Pineda (79 y.o. female)  Date: 10/17/2024  Diagnosis: Orthostatic hypotension [I95.1]  RIAN (acute kidney injury) ([facility]) [N17.9] RIAN (acute kidney injury) ([facility])      Precautions: Fall Risk, General Precautions,  ,  ,  ,  ,  ,  ,      Chart, occupational therapy assessment, plan of care, and goals were reviewed.  ASSESSMENT:  Pt presented supine in bed upon entry, A & O X 2, and agreeable to work with OT. Pt came to EOB  Supervision in prep for functional tasks. Once sitting, she engaged in oral care with S/U and able to doff/inna her socks Supervision with leg cross method. STS transfer Supervision with RW. She maneuvered ~ 5 ft Supervision with RW then requesting to return back to supine 2/2 fatigue. Pt was left with all needs within reach and bed alarm active. RN  made aware.  Progression toward goals:  []          Improving appropriately and progressing toward goals  [x]          Improving slowly and progressing toward goals  []          Not making progress toward goals and plan of care will be adjusted     PLAN:  Patient continues to benefit from skilled intervention to address the above impairments.  Continue treatment per established plan of care.    Further Equipment Recommendations for Discharge: MICHAEL    AMPA: AM-PAC Inpatient Daily Activity Raw Score: 19    Current research shows that an AM-PAC score of 18 or greater is associated with a discharge to the patient's home setting.    This AMPAC score should be considered in conjunction with interdisciplinary team recommendations to determine the most appropriate discharge setting. Patient's social support, diagnosis, medical stability, and prior level of function should also be taken into consideration.     SUBJECTIVE:   Patient stated, \"I am doing good.\"    OBJECTIVE DATA SUMMARY:   Cognitive/Behavioral Status:  Orientation  Orientation Level: Oriented to person;Oriented to place;Disoriented to time;Disoriented to situation       Functional Mobility and Transfers for ADLs:   Bed Mobility:  Bed Mobility Training  Supine to Sit: Supervision  Sit to Supine: Supervision   Transfers:  Transfer Training  Sit to Stand: Supervision  Stand to Sit: Supervision    Balance:  Balance  Sitting: Intact  Standing: Impaired  Standing - Static: Good  Standing - Dynamic: Fair    ADL Intervention:     Grooming: Setup     LE Dressing: Supervision     Pain:  Intensity Pre-treatment: 0/10   Intensity Post-treatment:0/10      Activity Tolerance:    Activity Tolerance: Patient tolerated treatment well  Please refer to the flowsheet for vital signs taken during this treatment.  After treatment:   []  Patient left in no apparent distress sitting up in chair  [x]  Patient left in no apparent distress in bed  [x]  Call bell left within reach  [x]

## 2024-10-17 NOTE — PLAN OF CARE
Problem: Chronic Conditions and Co-morbidities  Goal: Patient's chronic conditions and co-morbidity symptoms are monitored and maintained or improved  Outcome: Progressing     Problem: Discharge Planning  Goal: Discharge to home or other facility with appropriate resources  10/17/2024 0925 by Jessica Benitez RN  Outcome: Progressing  10/16/2024 2101 by Kali Luis RN  Outcome: Progressing     Problem: Pain  Goal: Verbalizes/displays adequate comfort level or baseline comfort level  10/17/2024 0925 by Jessica Benitez RN  Outcome: Progressing  10/16/2024 2101 by Kali Luis RN  Outcome: Progressing     Problem: Safety - Adult  Goal: Free from fall injury  10/17/2024 0925 by Jessica Benitez RN  Outcome: Progressing  10/16/2024 2101 by Kali Luis RN  Outcome: Progressing     Problem: Genitourinary - Adult  Goal: Urinary catheter remains patent  Outcome: Progressing     Problem: Metabolic/Fluid and Electrolytes - Adult  Goal: Electrolytes maintained within normal limits  Outcome: Progressing     Problem: Neurosensory - Adult  Goal: Achieves stable or improved neurological status  Outcome: Progressing  Goal: Remains free of injury related to seizures activity  Outcome: Progressing     Problem: Cardiovascular - Adult  Goal: Maintains optimal cardiac output and hemodynamic stability  Outcome: Progressing     Problem: Musculoskeletal - Adult  Goal: Return mobility to safest level of function  Outcome: Progressing     Problem: Infection - Adult  Goal: Absence of infection at discharge  Outcome: Progressing

## 2024-10-17 NOTE — PROGRESS NOTES
4 Eyes Skin Assessment     NAME:  Carleen Pineda  YOB: 1945  MEDICAL RECORD NUMBER:  [id number]    The patient is being assessed for  Shift Handoff    I agree that at least one RN has performed a thorough Head to Toe Skin Assessment on the patient. ALL assessment sites listed below have been assessed.      Areas assessed by both nurses:    Head, Face, Ears, Shoulders, Back, Chest, Arms, Elbows, Hands, Sacrum. Buttock, Coccyx, Ischium, and Legs. Feet and Heels        Does the Patient have a Wound? Yes wound(s) were present on assessment. LDA wound assessment was Initiated and completed by RN       Suhas Prevention initiated by RN: Yes  Wound Care Orders initiated by RN: Yes    Pressure Injury (Stage 3,4, Unstageable, DTI, NWPT, and Complex wounds) if present, place Wound referral order by RN under : No    New Ostomies, if present place, Ostomy referral order under : No     Nurse 1 eSignature: Electronically signed by Jessica Jimenez RN on 10/17/24 at 7:11 PM EDT    **SHARE this note so that the co-signing nurse can place an eSignature**    Nurse 2 eSignature: Electronically signed by LINUS LILLY RN on 10/18/24 at 12:24 AM EDT

## 2024-10-18 VITALS
OXYGEN SATURATION: 99 % | HEIGHT: 62 IN | SYSTOLIC BLOOD PRESSURE: 109 MMHG | TEMPERATURE: 97.1 F | HEART RATE: 56 BPM | WEIGHT: 134.26 LBS | BODY MASS INDEX: 24.71 KG/M2 | RESPIRATION RATE: 17 BRPM | DIASTOLIC BLOOD PRESSURE: 49 MMHG

## 2024-10-18 LAB
ALBUMIN SERPL-MCNC: 2.4 G/DL (ref 3.4–5)
ALBUMIN/GLOB SERPL: 0.6 (ref 0.8–1.7)
ALP SERPL-CCNC: 135 U/L (ref 45–117)
ALT SERPL-CCNC: 36 U/L (ref 13–56)
ANION GAP SERPL CALC-SCNC: 6 MMOL/L (ref 3–18)
AST SERPL-CCNC: 32 U/L (ref 10–38)
BASOPHILS # BLD: 0 K/UL (ref 0–0.1)
BASOPHILS NFR BLD: 0 % (ref 0–2)
BILIRUB SERPL-MCNC: 0.8 MG/DL (ref 0.2–1)
BUN SERPL-MCNC: 71 MG/DL (ref 7–18)
BUN/CREAT SERPL: 23 (ref 12–20)
CALCIUM SERPL-MCNC: 8.7 MG/DL (ref 8.5–10.1)
CHLORIDE SERPL-SCNC: 104 MMOL/L (ref 100–111)
CO2 SERPL-SCNC: 27 MMOL/L (ref 21–32)
CREAT SERPL-MCNC: 3.12 MG/DL (ref 0.6–1.3)
DIFFERENTIAL METHOD BLD: ABNORMAL
EOSINOPHIL # BLD: 0.1 K/UL (ref 0–0.4)
EOSINOPHIL NFR BLD: 2 % (ref 0–5)
ERYTHROCYTE [DISTWIDTH] IN BLOOD BY AUTOMATED COUNT: 18.2 % (ref 11.6–14.5)
GLOBULIN SER CALC-MCNC: 4.2 G/DL (ref 2–4)
GLUCOSE BLD STRIP.AUTO-MCNC: 124 MG/DL (ref 70–110)
GLUCOSE BLD STRIP.AUTO-MCNC: 177 MG/DL (ref 70–110)
GLUCOSE BLD STRIP.AUTO-MCNC: 286 MG/DL (ref 70–110)
GLUCOSE BLD STRIP.AUTO-MCNC: >600 MG/DL (ref 70–110)
GLUCOSE SERPL-MCNC: 158 MG/DL (ref 74–99)
HCT VFR BLD AUTO: 29.8 % (ref 35–45)
HGB BLD-MCNC: 9.3 G/DL (ref 12–16)
IMM GRANULOCYTES # BLD AUTO: 0 K/UL (ref 0–0.04)
IMM GRANULOCYTES NFR BLD AUTO: 0 % (ref 0–0.5)
LYMPHOCYTES # BLD: 0.8 K/UL (ref 0.9–3.6)
LYMPHOCYTES NFR BLD: 15 % (ref 21–52)
MCH RBC QN AUTO: 27.8 PG (ref 24–34)
MCHC RBC AUTO-ENTMCNC: 31.2 G/DL (ref 31–37)
MCV RBC AUTO: 89.2 FL (ref 78–100)
MONOCYTES # BLD: 0.7 K/UL (ref 0.05–1.2)
MONOCYTES NFR BLD: 12 % (ref 3–10)
NEUTS SEG # BLD: 3.7 K/UL (ref 1.8–8)
NEUTS SEG NFR BLD: 70 % (ref 40–73)
NRBC # BLD: 0 K/UL (ref 0–0.01)
NRBC BLD-RTO: 0 PER 100 WBC
PHOSPHATE SERPL-MCNC: 2.6 MG/DL (ref 2.5–4.9)
PLATELET # BLD AUTO: 238 K/UL (ref 135–420)
PMV BLD AUTO: 10.2 FL (ref 9.2–11.8)
POTASSIUM SERPL-SCNC: 4.2 MMOL/L (ref 3.5–5.5)
PROT SERPL-MCNC: 6.6 G/DL (ref 6.4–8.2)
RBC # BLD AUTO: 3.34 M/UL (ref 4.2–5.3)
SODIUM SERPL-SCNC: 137 MMOL/L (ref 136–145)
WBC # BLD AUTO: 5.3 K/UL (ref 4.6–13.2)

## 2024-10-18 PROCEDURE — 84100 ASSAY OF PHOSPHORUS: CPT

## 2024-10-18 PROCEDURE — 6370000000 HC RX 637 (ALT 250 FOR IP): Performed by: STUDENT IN AN ORGANIZED HEALTH CARE EDUCATION/TRAINING PROGRAM

## 2024-10-18 PROCEDURE — 2580000003 HC RX 258: Performed by: HOSPITALIST

## 2024-10-18 PROCEDURE — 80053 COMPREHEN METABOLIC PANEL: CPT

## 2024-10-18 PROCEDURE — 99239 HOSP IP/OBS DSCHRG MGMT >30: CPT | Performed by: STUDENT IN AN ORGANIZED HEALTH CARE EDUCATION/TRAINING PROGRAM

## 2024-10-18 PROCEDURE — 36415 COLL VENOUS BLD VENIPUNCTURE: CPT

## 2024-10-18 PROCEDURE — 82962 GLUCOSE BLOOD TEST: CPT

## 2024-10-18 PROCEDURE — 6370000000 HC RX 637 (ALT 250 FOR IP): Performed by: HOSPITALIST

## 2024-10-18 PROCEDURE — 94761 N-INVAS EAR/PLS OXIMETRY MLT: CPT

## 2024-10-18 PROCEDURE — 85025 COMPLETE CBC W/AUTO DIFF WBC: CPT

## 2024-10-18 RX ORDER — MIDODRINE HYDROCHLORIDE 5 MG/1
5 TABLET ORAL
Qty: 90 TABLET | Refills: 3 | Status: SHIPPED | OUTPATIENT
Start: 2024-10-18

## 2024-10-18 RX ORDER — LANOLIN ALCOHOL/MO/W.PET/CERES
1000 CREAM (GRAM) TOPICAL DAILY
Status: ON HOLD | COMMUNITY
End: 2024-10-18 | Stop reason: HOSPADM

## 2024-10-18 RX ORDER — MEMANTINE HYDROCHLORIDE 5 MG/1
5 TABLET ORAL DAILY
Status: DISCONTINUED | OUTPATIENT
Start: 2024-10-18 | End: 2024-10-18 | Stop reason: HOSPADM

## 2024-10-18 RX ADMIN — APIXABAN 2.5 MG: 2.5 TABLET, FILM COATED ORAL at 10:36

## 2024-10-18 RX ADMIN — INSULIN LISPRO 2 UNITS: 100 INJECTION, SOLUTION INTRAVENOUS; SUBCUTANEOUS at 12:23

## 2024-10-18 RX ADMIN — SODIUM CHLORIDE, PRESERVATIVE FREE 10 ML: 5 INJECTION INTRAVENOUS at 10:24

## 2024-10-18 RX ADMIN — THERA TABS 1 TABLET: TAB at 10:34

## 2024-10-18 RX ADMIN — AMIODARONE HYDROCHLORIDE 200 MG: 200 TABLET ORAL at 12:23

## 2024-10-18 RX ADMIN — ASPIRIN 81 MG: 81 TABLET, COATED ORAL at 10:34

## 2024-10-18 ASSESSMENT — PAIN SCALES - GENERAL: PAINLEVEL_OUTOF10: 0

## 2024-10-18 NOTE — DISCHARGE INSTRUCTIONS
DISCHARGE SUMMARY from Nurse    PATIENT INSTRUCTIONS:    After general anesthesia or intravenous sedation, for 24 hours or while taking prescription Narcotics:  Limit your activities  Do not drive and operate hazardous machinery  Do not make important personal or business decisions  Do  not drink alcoholic beverages  If you have not urinated within 8 hours after discharge, please contact your surgeon on call.    Report the following to your surgeon:  Excessive pain, swelling, redness or odor of or around the surgical area  Temperature over 100.5  Nausea and vomiting lasting longer than 4 hours or if unable to take medications  Any signs of decreased circulation or nerve impairment to extremity: change in color, persistent  numbness, tingling, coldness or increase pain  Any questions    What to do at Home:  Recommended activity: activity as tolerated, use cautions to prevent falls    If you experience any of the following symptoms chest pain, shortness of breath, increased weakness, temps greater than 100.5, increased confusion, difficulty urinating once esteban removed, please follow up with MD or call 911/come to ER .if an emergency    *  Please give a list of your current medications to your Primary Care Provider.    *  Please update this list whenever your medications are discontinued, doses are      changed, or new medications (including over-the-counter products) are added.    *  Please carry medication information at all times in case of emergency situations.    These are general instructions for a healthy lifestyle:    No smoking/ No tobacco products/ Avoid exposure to second hand smoke  Surgeon General's Warning:  Quitting smoking now greatly reduces serious risk to your health.    Obesity, smoking, and sedentary lifestyle greatly increases your risk for illness    A healthy diet, regular physical exercise & weight monitoring are important for maintaining a healthy lifestyle    You may be retaining fluid if

## 2024-10-18 NOTE — CARE COORDINATION
10/18/24 1457   IMM Letter   IMM Letter given to Patient/Family/Significant other/Guardian/POA/by: Nahomi Ramsay   IMM Letter date given: 10/18/24   IMM Letter time given: 1457     Patient waived 4 hour notice of discharge requirement by Medicare.    Nahomi COMBS,RN, [facility]  Case Management  [phone]

## 2024-10-18 NOTE — PROGRESS NOTES
conducted an initial consultation and Spiritual Assessment for Carleen Pineda, who is a 79 y.o.,female. Patient's Primary Language is: English.   According to the patient's EMR Restorationist Affiliation is: Episcopalian.     The reason the Patient came to the hospital is:   Patient Active Problem List    Diagnosis Date Noted    Essential hypertension, benign 10/16/2024    Mitral valve insufficiency 10/16/2024    Non-insulin dependent type 2 diabetes mellitus (HCC) 10/16/2024    Nonischemic cardiomyopathy ([facility]) 10/16/2024    CKD (chronic kidney disease) stage 4, GFR 15-29 ml/min ([facility]) 10/16/2024    RIAN (acute kidney injury) ([facility]) 10/15/2024    Mild vascular dementia without behavioral disturbance, psychotic disturbance, mood disturbance, or anxiety ([facility]) 10/04/2024    Retention of urine 03/29/2024    Coronary artery disease involving native heart without angina pectoris 11/28/2023    Paroxysmal atrial fibrillation ([facility]) 03/21/2021        The  provided the following Interventions:  Initiated a relationship of care and support. Patient resting and daughter at bedside. Hopeful to be discharged on today. Much better since admission.   Explored issues of alden, belief, spirituality and Baptism/ritual needs while hospitalized.  Listened empathically. Patient processed feeling about current hospitalization. Patient expressed gratitude for 's visit.   Provided information about Spiritual Care Services.  Offered prayer and assurance of continued prayers on patient's behalf.   Chart reviewed.     Jacek Anne   Staff   Spiritual Health  [phone]    Spiritual Health History and Assessment/Progress Note  [facility]    Spiritual/Emotional Needs,  ,  ,      Name: Carleen Pineda MRN: [id number]    Age: 79 y.o.     Sex: female   Language: English   Faith: Episcopalian   RIAN (acute kidney injury) ([facility])     Date: 10/18/2024            Total Time Calculated: 6 min

## 2024-10-18 NOTE — DISCHARGE SUMMARY
Discharge Summary    Patient: Carleen Pineda MRN: [id number]  CSN: [id number]    YOB: 1945  Age: 79 y.o.  Sex: female    DOA: 10/14/2024 LOS:  LOS: 3 days   Discharge Date:  ***     Admission Diagnosis: Orthostatic hypotension [I95.1]  RIAN (acute kidney injury) (HCC) [N17.9]    Discharge Diagnosis:  ***    Discharge Condition: Stable    Discharge Disposition: {DISCHARGE MANAGEMENT:59843}    PHYSICAL EXAM    Visit Vitals  /71   Pulse 50   Temp 98.1 °F (36.7 °C) (Oral)   Resp 18   Ht 1.575 m (5' 2\")   Wt 60.9 kg (134 lb 4.2 oz)   SpO2 99%   BMI 24.56 kg/m²     ***  General: Alert, cooperative, no acute distress    HEENT: NC, Atraumatic.  PERRLA, EOMI. Anicteric sclerae.  Lungs:  CTA Bilaterally. No Wheezing/Rhonchi/Rales.  Heart:  Regular  rhythm,  No murmur, No Rubs, No Gallops  Abdomen: Soft, Non distended, Non tender.  +Bowel sounds, no HSM  Extremities: No c/c/e  Psych:   Good insight. Not anxious or agitated.  Neurologic:  CN 2-12 grossly intact, oriented X ***.  No acute neurological deficits,     Hospital Course By Problem:   ***    Consults: ***    Significant Diagnostic Studies: Xray Result (most recent):  XR CHEST PORTABLE 10/14/2024    Narrative  EXAM: Chest Radiograph    INDICATION:  hypotension    TECHNIQUE: AP view of the chest    COMPARISON: 3/7/2013, 12/3/2012, 4/14/2010    FINDINGS: No pneumothorax identified.  The lungs are clear.  No infiltrates  appreciated. No effusions identified.  The cardiomediastinal silhouette is  unremarkable.   The pulmonary vasculature is unremarkable.  The osseous  structures are unremarkable.    Impression  1.  No acute cardiopulmonary process.    Electronically signed by Fredis Carson    CAT Scan Result (most recent):  CT CHEST WO CONTRAST 10/01/2024    Narrative  EXAM: CT CHEST WITHOUT IV CONTRAST    CLINICAL INDICATION/HISTORY: pneumonia suspected . Decreased oxygen saturation. Shortness of breath with increased lower extremity

## 2024-10-18 NOTE — PROGRESS NOTES
Discharged home with esteban catheter. [facility] Daughter is knowledgeable on how to empty the catheter and to give janie care with cleaning around the insertion site and down the tube. Transported to exit per w/c. No distress noted at time of discharge.

## 2024-10-18 NOTE — WOUND CARE
4 Eyes Skin Assessment     NAME:  Carleen Pineda  YOB: 1945  MEDICAL RECORD NUMBER:  [id number]    The patient is being assessed for  Shift Handoff    I agree that at least one RN has performed a thorough Head to Toe Skin Assessment on the patient. ALL assessment sites listed below have been assessed.      Areas assessed by both nurses:    Head, Face, Ears, Shoulders, Back, Chest, Arms, Elbows, Hands, Sacrum. Buttock, Coccyx, Ischium, Legs. Feet and Heels, and Under Medical Devices         Does the Patient have a Wound? Yes wound(s) were present on assessment. LDA wound assessment was Initiated and completed by RN       Suhas Prevention initiated by RN: Yes  Wound Care Orders initiated by RN: No    Pressure Injury (Stage 3,4, Unstageable, DTI, NWPT, and Complex wounds) if present, place Wound referral order by RN under : Yes  Pressure areas on coocyx and right heel.  New Ostomies, if present place, Ostomy referral order under : No     Nurse 1 eSignature: Electronically signed by LINUS LILLY RN on 10/18/24 at 12:38 AM EDT    **SHARE this note so that the co-signing nurse can place an eSignature**    Nurse 2 eSignature: {Esignature:[id number]}

## 2024-10-18 NOTE — CARE COORDINATION
Discharge order noted for today. Orders received.     Home health arranged with [facility]. Updated orders faxed today to include skilled nursing.     No other needs identified at this time. Case management remains available as needed.    Patient's daughter's in room to transport patient home at time of discharge.    Nahomi BOTELLON,RN, [facility]  Case Management  [phone]

## 2024-10-18 NOTE — PROGRESS NOTES
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  #4 mild hyponatremia secondary to CHF  #5 heart failure with reduced EF, severe pulmonary hypertension, RV dysfunction  #6 anemia of CKD  #7 nausea, Vx, GI symptoms     Recommendations:   #1 strict intake output charting,  Worthy catheter in place (has been in place since [facility] hospital stay, failed voiding trial)  #2 holding BP meds , ok to restart 1 mg Bumex at home starting tomorrow , needs renal panel in 1 week and f/u with nephro in 2-3 weeks , hold off faxiga   #3 continue midodrine 5 mg TID   #4 ncourage po intake   #5 monitor electrolytes renal functions daily  #6 renally dose medications for EGFR of less than 15    F/u with renal after discharge in 2-3 weeks      Discussed plan with patient ,     Please call with questions     Stanley Alvarez MD FASN  Cell [phone]  Pager: [phone]  Fax   [phone]     Subjective:     - intake improved  - respiratory - stable, no SOB  - hemodynamics - stable, no pressrs  - UOP-ok  - Nutrition -ok    Objective:     /60   Pulse 54   Temp 97.8 °F (36.6 °C) (Oral)   Resp 16   Ht 1.575 m (5' 2\")   Wt 60.9 kg (134 lb 4.2 oz)   SpO2 98%   BMI 24.56 kg/m²       Intake/Output Summary (Last 24 hours) at 10/18/2024 1148  Last data filed at 10/17/2024 2210  Gross per 24 hour   Intake 200 ml   Output 700 ml   Net -500 ml       Physical Exam:     Lying flat in no distress  HENT mmm  RS AEBE   CVS s1s2 wnl no JVD  Ext edema none   Skin no rashes  Neuro oriented X 3     Data Review:    Recent Labs     10/18/24  0312   WBC 5.3   RBC 3.34*   HCT 29.8*   MCV 89.2   MCH 27.8   MCHC 31.2   RDW 18.2*   MPV 10.2     Recent Labs     10/16/24  0349 10/17/24  0324 10/18/24  0312   BUN 89* 83* 71*    K 4.1 3.9 4.2    136 137   CL 99* 102 104   CO2 31 30 27   PHOS 4.5 3.8 2.6       Stanley Alvarez MD

## 2024-10-24 NOTE — PROGRESS NOTES
Physician Progress Note      PATIENT:               DOMINGO COOPER  CSN #:                  [id number]  :                       1945  ADMIT DATE:       10/14/2024 8:14 PM  DISCH DATE:        10/18/2024 5:13 PM  RESPONDING  PROVIDER #:        Stanley Alvarez MD          QUERY TEXT:    Patient admitted with acute kidney injury. ?Noted documentation of   hyponatremia in Nephrology PN on 10/17. In order to support the diagnosis of   hyponatremia, please include additional clinical indicators in your   documentation.  Or please document if the diagnosis of hyponatremia has been   ruled out after further study.    The medical record reflects the following:  Risk Factors: acute kidney injury on chronic kidney disease stage IV  Clinical Indicators: Nephrology PN on 10/17 mild hyponatremia secondary to CHF  SODIUM 136, 137, 131L    Treatment: sodium chloride infusion, monitoring lab,    Thank you,  ROBBI Davalos CDS  Options provided:  -- hyponatremia present as evidenced by, Please document evidence.  -- hyponatremia was ruled out  -- Other - I will add my own diagnosis  -- Disagree - Not applicable / Not valid  -- Disagree - Clinically unable to determine / Unknown  -- Refer to Clinical Documentation Reviewer    PROVIDER RESPONSE TEXT:    hyponatremia is present as evidenced by sodium was 131 on 10/14 but improved   with hydration with NS    Query created by: Tania Oconnor on 10/19/2024 6:47 AM      Electronically signed by:  Stanley Alvarez MD 10/24/2024 10:07 AM

## 2025-07-01 ENCOUNTER — HOSPITAL ENCOUNTER (EMERGENCY)
Facility: HOSPITAL | Age: 80
Discharge: HOME | End: 2025-07-02
Payer: MEDICARE

## 2025-07-01 ENCOUNTER — APPOINTMENT (OUTPATIENT)
Facility: HOSPITAL | Age: 80
End: 2025-07-01
Payer: MEDICARE

## 2025-07-01 DIAGNOSIS — I95.1 ORTHOSTATIC HYPOTENSION: Primary | ICD-10-CM

## 2025-07-01 DIAGNOSIS — R42 DIZZINESS: ICD-10-CM

## 2025-07-01 LAB
ALBUMIN SERPL-MCNC: 3.3 G/DL (ref 3.4–5)
ALBUMIN/GLOB SERPL: 0.8 (ref 0.8–1.7)
ALP SERPL-CCNC: 224 U/L (ref 45–117)
ALT SERPL-CCNC: 14 U/L (ref 10–35)
ANION GAP SERPL CALC-SCNC: 12 MMOL/L (ref 3–18)
APPEARANCE UR: CLEAR
AST SERPL-CCNC: 30 U/L (ref 10–38)
BASOPHILS # BLD: 0.02 K/UL (ref 0–0.1)
BASOPHILS NFR BLD: 0.3 % (ref 0–2)
BILIRUB SERPL-MCNC: 0.3 MG/DL (ref 0.2–1)
BILIRUB UR QL: NEGATIVE
BUN SERPL-MCNC: 58 MG/DL (ref 6–23)
BUN/CREAT SERPL: 21 (ref 12–20)
CALCIUM SERPL-MCNC: 9.6 MG/DL (ref 8.5–10.1)
CHLORIDE SERPL-SCNC: 107 MMOL/L (ref 98–107)
CO2 SERPL-SCNC: 24 MMOL/L (ref 21–32)
COLOR UR: YELLOW
CREAT SERPL-MCNC: 2.82 MG/DL (ref 0.6–1.3)
DIFFERENTIAL METHOD BLD: ABNORMAL
EOSINOPHIL # BLD: 0.71 K/UL (ref 0–0.4)
EOSINOPHIL NFR BLD: 12.1 % (ref 0–5)
ERYTHROCYTE [DISTWIDTH] IN BLOOD BY AUTOMATED COUNT: 13.2 % (ref 11.6–14.5)
GLOBULIN SER CALC-MCNC: 4.1 G/DL (ref 2–4)
GLUCOSE BLD STRIP.AUTO-MCNC: 158 MG/DL (ref 70–110)
GLUCOSE SERPL-MCNC: 125 MG/DL (ref 74–108)
GLUCOSE UR STRIP.AUTO-MCNC: NEGATIVE MG/DL
HCT VFR BLD AUTO: 32.1 % (ref 35–45)
HGB BLD-MCNC: 10 G/DL (ref 12–16)
HGB UR QL STRIP: NEGATIVE
IMM GRANULOCYTES # BLD AUTO: 0.02 K/UL (ref 0–0.04)
IMM GRANULOCYTES NFR BLD AUTO: 0.3 % (ref 0–0.5)
KETONES UR QL STRIP.AUTO: NEGATIVE MG/DL
LEUKOCYTE ESTERASE UR QL STRIP.AUTO: NEGATIVE
LYMPHOCYTES # BLD: 0.87 K/UL (ref 0.9–3.6)
LYMPHOCYTES NFR BLD: 14.8 % (ref 21–52)
MAGNESIUM SERPL-MCNC: 2.2 MG/DL (ref 1.6–2.6)
MCH RBC QN AUTO: 29.6 PG (ref 24–34)
MCHC RBC AUTO-ENTMCNC: 31.2 G/DL (ref 31–37)
MCV RBC AUTO: 95 FL (ref 78–100)
MONOCYTES # BLD: 0.54 K/UL (ref 0.05–1.2)
MONOCYTES NFR BLD: 9.2 % (ref 3–10)
NEUTS SEG # BLD: 3.73 K/UL (ref 1.8–8)
NEUTS SEG NFR BLD: 63.3 % (ref 40–73)
NITRITE UR QL STRIP.AUTO: NEGATIVE
NRBC # BLD: 0 K/UL (ref 0–0.01)
NRBC BLD-RTO: 0 PER 100 WBC
PH UR STRIP: 5.5 (ref 5–8)
PLATELET # BLD AUTO: 238 K/UL (ref 135–420)
PMV BLD AUTO: 10 FL (ref 9.2–11.8)
POTASSIUM SERPL-SCNC: 5.1 MMOL/L (ref 3.5–5.5)
PROT SERPL-MCNC: 7.4 G/DL (ref 6.4–8.2)
PROT UR STRIP-MCNC: NEGATIVE MG/DL
RBC # BLD AUTO: 3.38 M/UL (ref 4.2–5.3)
SODIUM SERPL-SCNC: 143 MMOL/L (ref 136–145)
SP GR UR REFRACTOMETRY: 1.01 (ref 1–1.03)
TROPONIN T SERPL HS-MCNC: 60.1 NG/L (ref 0–14)
TROPONIN T SERPL HS-MCNC: 65.9 NG/L (ref 0–14)
UROBILINOGEN UR QL STRIP.AUTO: 0.2 EU/DL (ref 0.2–1)
WBC # BLD AUTO: 5.9 K/UL (ref 4.6–13.2)

## 2025-07-01 PROCEDURE — 96360 HYDRATION IV INFUSION INIT: CPT

## 2025-07-01 PROCEDURE — 71045 X-RAY EXAM CHEST 1 VIEW: CPT

## 2025-07-01 PROCEDURE — 85025 COMPLETE CBC W/AUTO DIFF WBC: CPT

## 2025-07-01 PROCEDURE — 93005 ELECTROCARDIOGRAM TRACING: CPT | Performed by: PHYSICIAN ASSISTANT

## 2025-07-01 PROCEDURE — 83735 ASSAY OF MAGNESIUM: CPT

## 2025-07-01 PROCEDURE — 82962 GLUCOSE BLOOD TEST: CPT

## 2025-07-01 PROCEDURE — 80053 COMPREHEN METABOLIC PANEL: CPT

## 2025-07-01 PROCEDURE — 81003 URINALYSIS AUTO W/O SCOPE: CPT

## 2025-07-01 PROCEDURE — 84484 ASSAY OF TROPONIN QUANT: CPT

## 2025-07-01 PROCEDURE — 2580000003 HC RX 258: Performed by: PHYSICIAN ASSISTANT

## 2025-07-01 PROCEDURE — 99285 EMERGENCY DEPT VISIT HI MDM: CPT

## 2025-07-01 RX ORDER — 0.9 % SODIUM CHLORIDE 0.9 %
1000 INTRAVENOUS SOLUTION INTRAVENOUS ONCE
Status: COMPLETED | OUTPATIENT
Start: 2025-07-01 | End: 2025-07-01

## 2025-07-01 RX ADMIN — SODIUM CHLORIDE 1000 ML: 0.9 INJECTION, SOLUTION INTRAVENOUS at 21:17

## 2025-07-01 ASSESSMENT — ENCOUNTER SYMPTOMS
CHEST TIGHTNESS: 0
COUGH: 0
BACK PAIN: 0
VOMITING: 1
DIARRHEA: 0
PHOTOPHOBIA: 0
SORE THROAT: 0
ABDOMINAL DISTENTION: 0
SHORTNESS OF BREATH: 0
ABDOMINAL PAIN: 0
CONSTIPATION: 0
COLOR CHANGE: 0
NAUSEA: 1

## 2025-07-01 ASSESSMENT — PAIN - FUNCTIONAL ASSESSMENT: PAIN_FUNCTIONAL_ASSESSMENT: NONE - DENIES PAIN

## 2025-07-01 NOTE — ED PROVIDER NOTES
EMERGENCY DEPARTMENT HISTORY AND PHYSICAL EXAM    Date: 7/1/2025  Patient Name: Carleen Pineda    History of Presenting Illness     Chief Complaint   Patient presents with    Dizziness         History Provided By:Patient     Chief Complaint: Dizziness  Duration: Started experiencing dizziness when she woke up this AM.  Timing:  Felt dizzy when changing positions.  Location: N/A  Quality: N/A  Severity: Moderate  Modifying Factors: N/A  Associated Symptoms: Nausea all day; Vomiting x 1 episode      Additional History (Context): Carleen Pineda is a 80 y.o. female with PMH of Atrial Fibrillation, CHF, Bradycardia, DM, ESRD, HTN, anemia, and pacemaker placement in January 2025 who presents with dizziness, with accompanying nausea and vomiting x 1 episode. Pt and family report pt started feeling dizzy this morning when she woke up, which worsens with postural changes; Pt denies feeling dizzy or nauseous currently. Pt states she was nauseous all day and vomited once, without bloody contents. Per family, pt had no trauma, changes in daily activities, gait changes, and appetite changes leading up to today. Pt denies headache, vision disturbances, weakness, neck pain, chest pain, palpitations, back pain, SOB, numbness/tingling of extremities, and URI Sx. Stroke exam was negative; Gait was within normal baseline.    PCP: Opal Mcmullen MD    No current facility-administered medications for this encounter.     Current Outpatient Medications   Medication Sig Dispense Refill    tamsulosin (FLOMAX) 0.4 MG capsule Take 1 capsule by mouth daily 90 capsule 3    midodrine (PROAMATINE) 5 MG tablet Take 1 tablet by mouth 3 times daily (with meals) 90 tablet 3    carvedilol (COREG) 3.125 MG tablet Take 1 tablet by mouth 2 times daily (with meals)      ELIQUIS 2.5 MG TABS tablet Take 1 tablet by mouth 2 times daily      calcitRIOL (ROCALTROL) 0.25 MCG capsule Take 1 capsule by mouth every other day      memantine (NAMENDA) 5 MG tablet

## 2025-07-01 NOTE — ED TRIAGE NOTES
Pt presents to ED for dizziness since this morning. Pt states it feels like lightheadedness, however dtr states she has been saying the room is spinning all day.     Hx of dementia. Has pacemaker. No hx of stroke.    LKW 2300 last night. Glucose 159.

## 2025-07-02 VITALS
RESPIRATION RATE: 18 BRPM | BODY MASS INDEX: 28.52 KG/M2 | DIASTOLIC BLOOD PRESSURE: 90 MMHG | OXYGEN SATURATION: 100 % | WEIGHT: 155 LBS | TEMPERATURE: 98.1 F | HEART RATE: 90 BPM | HEIGHT: 62 IN | SYSTOLIC BLOOD PRESSURE: 155 MMHG

## 2025-07-02 LAB
EKG ATRIAL RATE: 73 BPM
EKG DIAGNOSIS: NORMAL
EKG Q-T INTERVAL: 462 MS
EKG QRS DURATION: 136 MS
EKG QTC CALCULATION (BAZETT): 505 MS
EKG R AXIS: 108 DEGREES
EKG T AXIS: 38 DEGREES
EKG VENTRICULAR RATE: 72 BPM

## 2025-07-02 PROCEDURE — 93010 ELECTROCARDIOGRAM REPORT: CPT | Performed by: INTERNAL MEDICINE

## 2025-07-02 NOTE — FLOWSHEET NOTE
07/01/25 1850   Vital Signs   Orthostatic B/P and Pulse? Yes   Blood Pressure Lying 151/77   Pulse Lying 70 PER MINUTE   Blood Pressure Sitting 152/79   Pulse Sitting 71 PER MINUTE   Blood Pressure Standing 107/71   Pulse Standing 79 PER MINUTE     ORTHOSTATICS PERFORMED AT 1850 BY RONALD AGUILAR